# Patient Record
Sex: MALE | Race: OTHER | HISPANIC OR LATINO | URBAN - METROPOLITAN AREA
[De-identification: names, ages, dates, MRNs, and addresses within clinical notes are randomized per-mention and may not be internally consistent; named-entity substitution may affect disease eponyms.]

---

## 2017-11-10 ENCOUNTER — EMERGENCY (EMERGENCY)
Facility: HOSPITAL | Age: 22
LOS: 0 days | Discharge: HOME | End: 2017-11-10
Attending: EMERGENCY MEDICINE | Admitting: EMERGENCY MEDICINE
Payer: MEDICAID

## 2017-11-10 VITALS — HEIGHT: 73 IN | WEIGHT: 130.07 LBS

## 2017-11-10 VITALS — DIASTOLIC BLOOD PRESSURE: 83 MMHG | HEART RATE: 65 BPM | SYSTOLIC BLOOD PRESSURE: 121 MMHG | TEMPERATURE: 98 F

## 2017-11-10 DIAGNOSIS — R05 COUGH: ICD-10-CM

## 2017-11-10 DIAGNOSIS — B34.9 VIRAL INFECTION, UNSPECIFIED: ICD-10-CM

## 2017-11-10 DIAGNOSIS — F17.200 NICOTINE DEPENDENCE, UNSPECIFIED, UNCOMPLICATED: ICD-10-CM

## 2017-11-10 DIAGNOSIS — R06.02 SHORTNESS OF BREATH: ICD-10-CM

## 2017-11-10 PROCEDURE — 99284 EMERGENCY DEPT VISIT MOD MDM: CPT

## 2017-11-10 PROCEDURE — 71020: CPT | Mod: 26

## 2017-11-10 PROCEDURE — 93010 ELECTROCARDIOGRAM REPORT: CPT

## 2017-11-10 RX ORDER — KETOROLAC TROMETHAMINE 30 MG/ML
15 SYRINGE (ML) INJECTION ONCE
Qty: 0 | Refills: 0 | Status: DISCONTINUED | OUTPATIENT
Start: 2017-11-10 | End: 2017-11-10

## 2017-11-10 RX ORDER — SODIUM CHLORIDE 9 MG/ML
1000 INJECTION INTRAMUSCULAR; INTRAVENOUS; SUBCUTANEOUS ONCE
Qty: 0 | Refills: 0 | Status: COMPLETED | OUTPATIENT
Start: 2017-11-10 | End: 2017-11-10

## 2017-11-10 RX ORDER — SODIUM CHLORIDE 9 MG/ML
3 INJECTION INTRAMUSCULAR; INTRAVENOUS; SUBCUTANEOUS EVERY 8 HOURS
Qty: 0 | Refills: 0 | Status: DISCONTINUED | OUTPATIENT
Start: 2017-11-10 | End: 2017-11-10

## 2017-11-10 RX ADMIN — Medication 15 MILLIGRAM(S): at 10:40

## 2017-11-10 RX ADMIN — SODIUM CHLORIDE 3 MILLILITER(S): 9 INJECTION INTRAMUSCULAR; INTRAVENOUS; SUBCUTANEOUS at 10:36

## 2017-11-10 RX ADMIN — Medication 15 MILLIGRAM(S): at 10:58

## 2017-11-10 RX ADMIN — SODIUM CHLORIDE 1000 MILLILITER(S): 9 INJECTION INTRAMUSCULAR; INTRAVENOUS; SUBCUTANEOUS at 10:35

## 2017-11-10 NOTE — ED STATDOCS - PROGRESS NOTE DETAILS
22 yr. old male PMH: Denied presents to ED with chest pain + nasal congestion and SOB onset this am upon awakening. no fever or chills. Seen and examined by attending in Intake. Plan: Chest X-Ray, EKG and pain med. Will F/U with results and re evaluate. Remy VERDIN 22 yr. old male PMH: Denied presents to ED with chest pain last night + nasal congestion and SOB onset this am upon awakening + productive cough last night.  No fever or chills. + smoker 1/2 ppd.  Seen and examined by attending in Intake. Plan: Chest X-Ray, EKG and pain med. Will F/U with results and re evaluate. Remy VERDIN

## 2017-11-10 NOTE — ED STATDOCS - OBJECTIVE STATEMENT
23 y/o M c/o CP last night. Pt woke up this morning with nasal congestion and SOB. Pt describes CP as a tightness. Laying down does not exacerbate CP. +productive cough all last night with clear phelgm. No FMHx. Current smoker, half a pack a day. Drinks socially.

## 2017-11-10 NOTE — ED ADULT NURSE NOTE - OBJECTIVE STATEMENT
pt c/o chest pain and sob, pt admit of drinking alcohol every night 3-4 drinks  and smoking marihuana denies any medical problems except of anxiety

## 2017-11-10 NOTE — ED STATDOCS - ATTENDING CONTRIBUTION TO CARE
I performed the initial face to face bedside interview with this patient regarding history of present illness, review of symptoms and past medical, social and family history.  I completed an independent physical examination.  I was the initial provider who evaluated this patient.  I have signed out the follow up of any pending tests (i.e. labs, radiological studies) to the NP.  I have discussed the patient’s plan of care and disposition with the NP.

## 2017-11-10 NOTE — SBIRT NOTE. - NSSBIRTSERVICES_GEN_A_ED_FT
Provided SBIRT services: Full screen positive. Brief Intervention Performed. Screening results were reviewed with the patient and patient was provided information about healthy guidelines and potential negative consequences associated with level of risk. Motivation and readiness to reduce or stop use was discussed and goals and activities to make changes were suggested/offered.  Audit Score: 10  DAST Score: 1

## 2017-11-10 NOTE — ED STATDOCS - MEDICAL DECISION MAKING DETAILS
21 y/o M otherwise healthy, mostly likely with viral syndrome. Will get CXR and EKG with signs of early repolarization. Pt with no signs of pericarditis. Pt's sx not consistent with ACS. Based on CXR results will give abx.

## 2018-01-01 ENCOUNTER — OUTPATIENT (OUTPATIENT)
Dept: OUTPATIENT SERVICES | Facility: HOSPITAL | Age: 23
LOS: 1 days | End: 2018-01-01
Payer: MEDICAID

## 2018-01-01 PROCEDURE — G9001: CPT

## 2018-01-19 ENCOUNTER — EMERGENCY (EMERGENCY)
Facility: HOSPITAL | Age: 23
LOS: 0 days | Discharge: HOME | End: 2018-01-19
Attending: EMERGENCY MEDICINE | Admitting: EMERGENCY MEDICINE
Payer: MEDICAID

## 2018-01-19 VITALS
SYSTOLIC BLOOD PRESSURE: 124 MMHG | WEIGHT: 130.07 LBS | TEMPERATURE: 98 F | HEIGHT: 73 IN | HEART RATE: 108 BPM | OXYGEN SATURATION: 98 % | RESPIRATION RATE: 18 BRPM | DIASTOLIC BLOOD PRESSURE: 87 MMHG

## 2018-01-19 VITALS
SYSTOLIC BLOOD PRESSURE: 126 MMHG | HEART RATE: 88 BPM | TEMPERATURE: 97 F | RESPIRATION RATE: 18 BRPM | OXYGEN SATURATION: 100 % | DIASTOLIC BLOOD PRESSURE: 84 MMHG
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
## 2018-01-19 DIAGNOSIS — M25.571 PAIN IN RIGHT ANKLE AND JOINTS OF RIGHT FOOT: ICD-10-CM

## 2018-01-19 DIAGNOSIS — S90.01XA CONTUSION OF RIGHT ANKLE, INITIAL ENCOUNTER: ICD-10-CM

## 2018-01-19 DIAGNOSIS — S93.401A SPRAIN OF UNSPECIFIED LIGAMENT OF RIGHT ANKLE, INITIAL ENCOUNTER: ICD-10-CM

## 2018-01-19 PROCEDURE — 71045 X-RAY EXAM CHEST 1 VIEW: CPT | Mod: 26

## 2018-01-19 PROCEDURE — 99284 EMERGENCY DEPT VISIT MOD MDM: CPT | Mod: 25

## 2018-01-19 PROCEDURE — 73030 X-RAY EXAM OF SHOULDER: CPT | Mod: 26,LT

## 2018-01-19 PROCEDURE — 29505 APPLICATION LONG LEG SPLINT: CPT | Mod: RT

## 2018-01-19 PROCEDURE — 72190 X-RAY EXAM OF PELVIS: CPT | Mod: 26

## 2018-01-19 PROCEDURE — 73630 X-RAY EXAM OF FOOT: CPT | Mod: 26,LT

## 2018-01-19 PROCEDURE — 73610 X-RAY EXAM OF ANKLE: CPT | Mod: 26,RT

## 2018-01-19 RX ORDER — ACETAMINOPHEN 500 MG
650 TABLET ORAL ONCE
Qty: 0 | Refills: 0 | Status: COMPLETED | OUTPATIENT
Start: 2018-01-19 | End: 2018-01-19

## 2018-01-19 RX ADMIN — Medication 650 MILLIGRAM(S): at 02:05
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
## 2018-01-19 NOTE — ED PROVIDER NOTE - MUSCULOSKELETAL, MLM
+ ttp right lat mall and right mid fot 2+ dp and pt pulses nrom rle all digits achilles intact + ttp left ant shoulder nrom left shoulder 2+ distal pulses no sensory deficits.

## 2018-01-19 NOTE — ED PROVIDER NOTE - OBJECTIVE STATEMENT
pt presents with left shoulder pain and right ankle pain s.p jumping out first floor window pain constant achy worse with moveemnt . denies fever. denies HA or neck pain. no chest pain or sob. no abd pain. no n/v/d. no urinary f/u/d. no back pain. no motor or sensory deficits. denies illicit drug use. no rash. no other acute issues symptoms or concerns

## 2018-01-22 DIAGNOSIS — R69 ILLNESS, UNSPECIFIED: ICD-10-CM

## 2018-01-30 ENCOUNTER — EMERGENCY (EMERGENCY)
Facility: HOSPITAL | Age: 23
LOS: 0 days | Discharge: HOME | End: 2018-01-30
Attending: EMERGENCY MEDICINE | Admitting: EMERGENCY MEDICINE
Payer: MEDICAID

## 2018-01-30 VITALS
OXYGEN SATURATION: 100 % | DIASTOLIC BLOOD PRESSURE: 68 MMHG | HEART RATE: 83 BPM | TEMPERATURE: 98 F | SYSTOLIC BLOOD PRESSURE: 128 MMHG

## 2018-01-30 VITALS — HEIGHT: 73 IN | WEIGHT: 130.07 LBS

## 2018-01-30 DIAGNOSIS — S60.521A BLISTER (NONTHERMAL) OF RIGHT HAND, INITIAL ENCOUNTER: ICD-10-CM
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
## 2018-01-30 DIAGNOSIS — R22.31 LOCALIZED SWELLING, MASS AND LUMP, RIGHT UPPER LIMB: ICD-10-CM
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
## 2018-01-30 PROCEDURE — 10060 I&D ABSCESS SIMPLE/SINGLE: CPT

## 2018-01-30 PROCEDURE — 99283 EMERGENCY DEPT VISIT LOW MDM: CPT

## 2018-01-30 RX ORDER — CEPHALEXIN 500 MG
1 CAPSULE ORAL
Qty: 21 | Refills: 0
Start: 2018-01-30 | End: 2018-02-05

## 2018-01-30 NOTE — ED STATDOCS - OBJECTIVE STATEMENT
23 y/o M with no PMHx presents to the ED c/o bump to palmar surface of right hand. Pt has been using crutches and states he has developed the bump over his R hand over the past week after he cut his hand.

## 2018-01-30 NOTE — ED STATDOCS - PROGRESS NOTE DETAILS
incision and drainage performed to blister on R palm.  Patient tolerated well.  care reviewed.  patient to be d/c home on hernandez Tsang PA-C

## 2018-01-30 NOTE — ED ADULT NURSE NOTE - CHPI ED SYMPTOMS NEG
no chills/no decreased eating/drinking/no weakness/no nausea/no dizziness/no vomiting/no pain/no fever/no numbness/no tingling

## 2018-01-30 NOTE — ED ADULT TRIAGE NOTE - CHIEF COMPLAINT QUOTE
pt states painful cyst to right hand after using crutches from leg injury. no fever discharge or pus noted.

## 2019-04-18 ENCOUNTER — EMERGENCY (EMERGENCY)
Facility: HOSPITAL | Age: 24
LOS: 1 days | End: 2019-04-18
Attending: EMERGENCY MEDICINE
Payer: COMMERCIAL

## 2019-04-18 VITALS
HEIGHT: 70 IN | TEMPERATURE: 98 F | RESPIRATION RATE: 16 BRPM | OXYGEN SATURATION: 97 % | WEIGHT: 160.06 LBS | HEART RATE: 69 BPM | SYSTOLIC BLOOD PRESSURE: 129 MMHG | DIASTOLIC BLOOD PRESSURE: 80 MMHG

## 2019-04-18 VITALS
SYSTOLIC BLOOD PRESSURE: 125 MMHG | RESPIRATION RATE: 16 BRPM | OXYGEN SATURATION: 99 % | HEART RATE: 70 BPM | TEMPERATURE: 98 F | DIASTOLIC BLOOD PRESSURE: 82 MMHG

## 2019-04-18 LAB
APPEARANCE UR: CLEAR — SIGNIFICANT CHANGE UP
BACTERIA # UR AUTO: NEGATIVE — SIGNIFICANT CHANGE UP
BILIRUB UR-MCNC: NEGATIVE — SIGNIFICANT CHANGE UP
C TRACH RRNA SPEC QL NAA+PROBE: SIGNIFICANT CHANGE UP
COLOR SPEC: YELLOW — SIGNIFICANT CHANGE UP
DIFF PNL FLD: ABNORMAL
EPI CELLS # UR: NEGATIVE — SIGNIFICANT CHANGE UP
GLUCOSE UR QL: NEGATIVE MG/DL — SIGNIFICANT CHANGE UP
HIV 1 & 2 AB SERPL IA.RAPID: SIGNIFICANT CHANGE UP
KETONES UR-MCNC: NEGATIVE — SIGNIFICANT CHANGE UP
LEUKOCYTE ESTERASE UR-ACNC: NEGATIVE — SIGNIFICANT CHANGE UP
N GONORRHOEA RRNA SPEC QL NAA+PROBE: SIGNIFICANT CHANGE UP
NITRITE UR-MCNC: NEGATIVE — SIGNIFICANT CHANGE UP
PH UR: 7 — SIGNIFICANT CHANGE UP (ref 5–8)
PROT UR-MCNC: NEGATIVE MG/DL — SIGNIFICANT CHANGE UP
RBC CASTS # UR COMP ASSIST: ABNORMAL /HPF (ref 0–4)
SP GR SPEC: 1.01 — SIGNIFICANT CHANGE UP (ref 1.01–1.02)
SPECIMEN SOURCE: SIGNIFICANT CHANGE UP
UROBILINOGEN FLD QL: 1 MG/DL
WBC UR QL: SIGNIFICANT CHANGE UP

## 2019-04-18 PROCEDURE — 36415 COLL VENOUS BLD VENIPUNCTURE: CPT

## 2019-04-18 PROCEDURE — 99283 EMERGENCY DEPT VISIT LOW MDM: CPT | Mod: 25

## 2019-04-18 PROCEDURE — 87491 CHLMYD TRACH DNA AMP PROBE: CPT

## 2019-04-18 PROCEDURE — 86703 HIV-1/HIV-2 1 RESULT ANTBDY: CPT

## 2019-04-18 PROCEDURE — 87086 URINE CULTURE/COLONY COUNT: CPT

## 2019-04-18 PROCEDURE — 96372 THER/PROPH/DIAG INJ SC/IM: CPT

## 2019-04-18 PROCEDURE — 87591 N.GONORRHOEAE DNA AMP PROB: CPT

## 2019-04-18 PROCEDURE — 81001 URINALYSIS AUTO W/SCOPE: CPT

## 2019-04-18 RX ORDER — CEFTRIAXONE 500 MG/1
250 INJECTION, POWDER, FOR SOLUTION INTRAMUSCULAR; INTRAVENOUS ONCE
Qty: 0 | Refills: 0 | Status: COMPLETED | OUTPATIENT
Start: 2019-04-18 | End: 2019-04-18

## 2019-04-18 RX ADMIN — CEFTRIAXONE 250 MILLIGRAM(S): 500 INJECTION, POWDER, FOR SOLUTION INTRAMUSCULAR; INTRAVENOUS at 09:59

## 2019-04-18 RX ADMIN — Medication 100 MILLIGRAM(S): at 09:37

## 2019-04-18 NOTE — ED ADULT NURSE NOTE - OBJECTIVE STATEMENT
Pt A&OX3, amb ad simba, c/o burning and pain to penis upon urination, denies any fevers.  Abd soft nondistended, nontender, moving all ext well.

## 2019-04-18 NOTE — ED PROVIDER NOTE - PLAN OF CARE
1. Return to ED for worsening, progressive or any other concerning symptoms   2. Follow up with your primary care doctor in 2-3days   3. Take 100mg of Doxycycline twice a day for 7 days. Avoid sun exposure, use birth control while taking this medication
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
## 2019-04-19 LAB
CULTURE RESULTS: NO GROWTH — SIGNIFICANT CHANGE UP
SPECIMEN SOURCE: SIGNIFICANT CHANGE UP

## 2019-05-06 ENCOUNTER — EMERGENCY (EMERGENCY)
Facility: HOSPITAL | Age: 24
LOS: 1 days | Discharge: HOME | End: 2019-05-06
Attending: EMERGENCY MEDICINE
Payer: COMMERCIAL

## 2019-05-06 VITALS
RESPIRATION RATE: 16 BRPM | DIASTOLIC BLOOD PRESSURE: 65 MMHG | HEART RATE: 94 BPM | TEMPERATURE: 99 F | OXYGEN SATURATION: 98 % | SYSTOLIC BLOOD PRESSURE: 110 MMHG

## 2019-05-06 VITALS
SYSTOLIC BLOOD PRESSURE: 138 MMHG | HEIGHT: 69 IN | TEMPERATURE: 101 F | OXYGEN SATURATION: 99 % | WEIGHT: 169.98 LBS | HEART RATE: 99 BPM | DIASTOLIC BLOOD PRESSURE: 87 MMHG

## 2019-05-06 PROBLEM — Z78.9 OTHER SPECIFIED HEALTH STATUS: Chronic | Status: ACTIVE | Noted: 2019-04-18

## 2019-05-06 LAB
ALBUMIN SERPL ELPH-MCNC: 4.7 G/DL — SIGNIFICANT CHANGE UP (ref 3.3–5.2)
ALP SERPL-CCNC: 72 U/L — SIGNIFICANT CHANGE UP (ref 40–120)
ALT FLD-CCNC: 53 U/L — HIGH
AMPHET UR-MCNC: NEGATIVE — SIGNIFICANT CHANGE UP
ANION GAP SERPL CALC-SCNC: 17 MMOL/L — SIGNIFICANT CHANGE UP (ref 5–17)
APPEARANCE UR: CLEAR — SIGNIFICANT CHANGE UP
APTT BLD: 34.2 SEC — SIGNIFICANT CHANGE UP (ref 27.5–36.3)
AST SERPL-CCNC: 54 U/L — HIGH
BACTERIA # UR AUTO: NEGATIVE — SIGNIFICANT CHANGE UP
BARBITURATES UR SCN-MCNC: NEGATIVE — SIGNIFICANT CHANGE UP
BASOPHILS # BLD AUTO: 0 K/UL — SIGNIFICANT CHANGE UP (ref 0–0.2)
BASOPHILS NFR BLD AUTO: 0.4 % — SIGNIFICANT CHANGE UP (ref 0–2)
BENZODIAZ UR-MCNC: NEGATIVE — SIGNIFICANT CHANGE UP
BILIRUB SERPL-MCNC: 0.3 MG/DL — LOW (ref 0.4–2)
BILIRUB UR-MCNC: NEGATIVE — SIGNIFICANT CHANGE UP
BUN SERPL-MCNC: 8 MG/DL — SIGNIFICANT CHANGE UP (ref 8–20)
CALCIUM SERPL-MCNC: 9.2 MG/DL — SIGNIFICANT CHANGE UP (ref 8.6–10.2)
CHLORIDE SERPL-SCNC: 99 MMOL/L — SIGNIFICANT CHANGE UP (ref 98–107)
CO2 SERPL-SCNC: 21 MMOL/L — LOW (ref 22–29)
COCAINE METAB.OTHER UR-MCNC: NEGATIVE — SIGNIFICANT CHANGE UP
COLOR SPEC: YELLOW — SIGNIFICANT CHANGE UP
CREAT SERPL-MCNC: 0.8 MG/DL — SIGNIFICANT CHANGE UP (ref 0.5–1.3)
DIFF PNL FLD: ABNORMAL
EOSINOPHIL # BLD AUTO: 0 K/UL — SIGNIFICANT CHANGE UP (ref 0–0.5)
EOSINOPHIL NFR BLD AUTO: 0.1 % — SIGNIFICANT CHANGE UP (ref 0–5)
EPI CELLS # UR: NEGATIVE — SIGNIFICANT CHANGE UP
ETHANOL SERPL-MCNC: <10 MG/DL — SIGNIFICANT CHANGE UP
FLU A RESULT: SIGNIFICANT CHANGE UP
FLU A RESULT: SIGNIFICANT CHANGE UP
FLUAV AG NPH QL: SIGNIFICANT CHANGE UP
FLUBV AG NPH QL: SIGNIFICANT CHANGE UP
GLUCOSE SERPL-MCNC: 94 MG/DL — SIGNIFICANT CHANGE UP (ref 70–115)
GLUCOSE UR QL: NEGATIVE MG/DL — SIGNIFICANT CHANGE UP
HCT VFR BLD CALC: 43.8 % — SIGNIFICANT CHANGE UP (ref 42–52)
HGB BLD-MCNC: 14.8 G/DL — SIGNIFICANT CHANGE UP (ref 14–18)
INR BLD: 1 RATIO — SIGNIFICANT CHANGE UP (ref 0.88–1.16)
KETONES UR-MCNC: ABNORMAL
LACTATE BLDV-MCNC: 1.2 MMOL/L — SIGNIFICANT CHANGE UP (ref 0.5–2)
LEUKOCYTE ESTERASE UR-ACNC: NEGATIVE — SIGNIFICANT CHANGE UP
LYMPHOCYTES # BLD AUTO: 0.7 K/UL — LOW (ref 1–4.8)
LYMPHOCYTES # BLD AUTO: 7.1 % — LOW (ref 20–55)
MCHC RBC-ENTMCNC: 30.6 PG — SIGNIFICANT CHANGE UP (ref 27–31)
MCHC RBC-ENTMCNC: 33.8 G/DL — SIGNIFICANT CHANGE UP (ref 32–36)
MCV RBC AUTO: 90.7 FL — SIGNIFICANT CHANGE UP (ref 80–94)
METHADONE UR-MCNC: NEGATIVE — SIGNIFICANT CHANGE UP
MONOCYTES # BLD AUTO: 0.5 K/UL — SIGNIFICANT CHANGE UP (ref 0–0.8)
MONOCYTES NFR BLD AUTO: 5 % — SIGNIFICANT CHANGE UP (ref 3–10)
NEUTROPHILS # BLD AUTO: 8.6 K/UL — HIGH (ref 1.8–8)
NEUTROPHILS NFR BLD AUTO: 87.2 % — HIGH (ref 37–73)
NITRITE UR-MCNC: NEGATIVE — SIGNIFICANT CHANGE UP
OPIATES UR-MCNC: NEGATIVE — SIGNIFICANT CHANGE UP
PCP SPEC-MCNC: SIGNIFICANT CHANGE UP
PCP UR-MCNC: NEGATIVE — SIGNIFICANT CHANGE UP
PH UR: 8 — SIGNIFICANT CHANGE UP (ref 5–8)
PLATELET # BLD AUTO: 314 K/UL — SIGNIFICANT CHANGE UP (ref 150–400)
POTASSIUM SERPL-MCNC: 4.3 MMOL/L — SIGNIFICANT CHANGE UP (ref 3.5–5.3)
POTASSIUM SERPL-SCNC: 4.3 MMOL/L — SIGNIFICANT CHANGE UP (ref 3.5–5.3)
PROT SERPL-MCNC: 7.8 G/DL — SIGNIFICANT CHANGE UP (ref 6.6–8.7)
PROT UR-MCNC: 15 MG/DL
PROTHROM AB SERPL-ACNC: 11.5 SEC — SIGNIFICANT CHANGE UP (ref 10–12.9)
RBC # BLD: 4.83 M/UL — SIGNIFICANT CHANGE UP (ref 4.6–6.2)
RBC # FLD: 13.4 % — SIGNIFICANT CHANGE UP (ref 11–15.6)
RBC CASTS # UR COMP ASSIST: SIGNIFICANT CHANGE UP /HPF (ref 0–4)
RSV RESULT: SIGNIFICANT CHANGE UP
RSV RNA RESP QL NAA+PROBE: SIGNIFICANT CHANGE UP
SODIUM SERPL-SCNC: 137 MMOL/L — SIGNIFICANT CHANGE UP (ref 135–145)
SP GR SPEC: 1.01 — SIGNIFICANT CHANGE UP (ref 1.01–1.02)
THC UR QL: POSITIVE
UROBILINOGEN FLD QL: NEGATIVE MG/DL — SIGNIFICANT CHANGE UP
WBC # BLD: 9.9 K/UL — SIGNIFICANT CHANGE UP (ref 4.8–10.8)
WBC # FLD AUTO: 9.9 K/UL — SIGNIFICANT CHANGE UP (ref 4.8–10.8)
WBC UR QL: SIGNIFICANT CHANGE UP

## 2019-05-06 PROCEDURE — 93005 ELECTROCARDIOGRAM TRACING: CPT

## 2019-05-06 PROCEDURE — 74177 CT ABD & PELVIS W/CONTRAST: CPT

## 2019-05-06 PROCEDURE — 85610 PROTHROMBIN TIME: CPT

## 2019-05-06 PROCEDURE — 83605 ASSAY OF LACTIC ACID: CPT

## 2019-05-06 PROCEDURE — 87631 RESP VIRUS 3-5 TARGETS: CPT

## 2019-05-06 PROCEDURE — 80307 DRUG TEST PRSMV CHEM ANLYZR: CPT

## 2019-05-06 PROCEDURE — 74177 CT ABD & PELVIS W/CONTRAST: CPT | Mod: 26

## 2019-05-06 PROCEDURE — 99284 EMERGENCY DEPT VISIT MOD MDM: CPT | Mod: 25

## 2019-05-06 PROCEDURE — 99284 EMERGENCY DEPT VISIT MOD MDM: CPT

## 2019-05-06 PROCEDURE — 85730 THROMBOPLASTIN TIME PARTIAL: CPT

## 2019-05-06 PROCEDURE — 80053 COMPREHEN METABOLIC PANEL: CPT

## 2019-05-06 PROCEDURE — 81001 URINALYSIS AUTO W/SCOPE: CPT

## 2019-05-06 PROCEDURE — 85027 COMPLETE CBC AUTOMATED: CPT

## 2019-05-06 PROCEDURE — 93010 ELECTROCARDIOGRAM REPORT: CPT

## 2019-05-06 PROCEDURE — 87040 BLOOD CULTURE FOR BACTERIA: CPT

## 2019-05-06 PROCEDURE — 71045 X-RAY EXAM CHEST 1 VIEW: CPT

## 2019-05-06 PROCEDURE — 71045 X-RAY EXAM CHEST 1 VIEW: CPT | Mod: 26

## 2019-05-06 PROCEDURE — 87086 URINE CULTURE/COLONY COUNT: CPT

## 2019-05-06 PROCEDURE — 96374 THER/PROPH/DIAG INJ IV PUSH: CPT | Mod: XU

## 2019-05-06 PROCEDURE — 36415 COLL VENOUS BLD VENIPUNCTURE: CPT

## 2019-05-06 RX ORDER — ONDANSETRON 8 MG/1
4 TABLET, FILM COATED ORAL ONCE
Qty: 0 | Refills: 0 | Status: COMPLETED | OUTPATIENT
Start: 2019-05-06 | End: 2019-05-06

## 2019-05-06 RX ORDER — ACETAMINOPHEN 500 MG
975 TABLET ORAL ONCE
Qty: 0 | Refills: 0 | Status: COMPLETED | OUTPATIENT
Start: 2019-05-06 | End: 2019-05-06

## 2019-05-06 RX ORDER — SODIUM CHLORIDE 9 MG/ML
2500 INJECTION INTRAMUSCULAR; INTRAVENOUS; SUBCUTANEOUS ONCE
Qty: 0 | Refills: 0 | Status: COMPLETED | OUTPATIENT
Start: 2019-05-06 | End: 2019-05-06

## 2019-05-06 RX ADMIN — Medication 975 MILLIGRAM(S): at 14:51

## 2019-05-06 RX ADMIN — SODIUM CHLORIDE 2500 MILLILITER(S): 9 INJECTION INTRAMUSCULAR; INTRAVENOUS; SUBCUTANEOUS at 14:47

## 2019-05-06 RX ADMIN — ONDANSETRON 4 MILLIGRAM(S): 8 TABLET, FILM COATED ORAL at 14:47

## 2019-05-06 RX ADMIN — Medication 975 MILLIGRAM(S): at 14:48
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
## 2019-05-06 NOTE — ED PROVIDER NOTE - CLINICAL SUMMARY MEDICAL DECISION MAKING FREE TEXT BOX
Pt states that he feels much better. He tolerated PO with no pain. His abd is soft and non-surgical, he states his pain has resolved, no vomiting or diarrhea while here, no leukocytosis, stable for dc
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
## 2019-05-06 NOTE — ED ADULT NURSE NOTE - OBJECTIVE STATEMENT
pt reports body aches, rlq abdominal pain, nausea, diarrhea, sore throat starting this am. pt reports "I drank a lot last night. feel hungover." a and o x3. breathing even and unlabored. sitting calm in bed. will continue to monitor.

## 2019-05-07 LAB
CULTURE RESULTS: NO GROWTH — SIGNIFICANT CHANGE UP
SPECIMEN SOURCE: SIGNIFICANT CHANGE UP

## 2019-05-11 LAB
CULTURE RESULTS: SIGNIFICANT CHANGE UP
CULTURE RESULTS: SIGNIFICANT CHANGE UP
SPECIMEN SOURCE: SIGNIFICANT CHANGE UP
SPECIMEN SOURCE: SIGNIFICANT CHANGE UP

## 2019-06-11 ENCOUNTER — EMERGENCY (EMERGENCY)
Facility: HOSPITAL | Age: 24
LOS: 1 days | Discharge: HOME | End: 2019-06-11
Attending: STUDENT IN AN ORGANIZED HEALTH CARE EDUCATION/TRAINING PROGRAM
Payer: COMMERCIAL

## 2019-06-11 VITALS
WEIGHT: 175.05 LBS | HEART RATE: 94 BPM | OXYGEN SATURATION: 97 % | DIASTOLIC BLOOD PRESSURE: 74 MMHG | RESPIRATION RATE: 16 BRPM | SYSTOLIC BLOOD PRESSURE: 120 MMHG | TEMPERATURE: 98 F | HEIGHT: 73 IN

## 2019-06-11 PROCEDURE — 99284 EMERGENCY DEPT VISIT MOD MDM: CPT

## 2019-06-11 PROCEDURE — 99283 EMERGENCY DEPT VISIT LOW MDM: CPT

## 2019-06-11 RX ORDER — FLUTICASONE PROPIONATE 50 MCG
1 SPRAY, SUSPENSION NASAL
Qty: 1 | Refills: 0
Start: 2019-06-11 | End: 2019-06-25

## 2019-06-11 RX ORDER — IBUPROFEN 200 MG
1 TABLET ORAL
Qty: 20 | Refills: 0
Start: 2019-06-11

## 2019-06-11 RX ORDER — AZITHROMYCIN 500 MG/1
1 TABLET, FILM COATED ORAL
Qty: 1 | Refills: 0
Start: 2019-06-11 | End: 2019-06-15

## 2019-06-11 NOTE — ED ADULT TRIAGE NOTE - CHIEF COMPLAINT QUOTE
"I feel all congested and I have a sore throat." Pt denies fever, has Nyquil at home but did not take it.  A & OX4.

## 2019-06-11 NOTE — ED PROVIDER NOTE - OBJECTIVE STATEMENT
22 Y/o male no Sig PMH presents in Er and c.O cough with green productive phlegm , nasal congestion and sore throat x 2-3 days ,states he had subjective fever at home and one episodes of  diarrhea with green in color . did not take any med since then . pt is current smoker . denies any , leg swollen , cough blood or N/V abdominal pain

## 2019-06-11 NOTE — ED PROVIDER NOTE - CLINICAL SUMMARY MEDICAL DECISION MAKING FREE TEXT BOX
24 Y/o male With URI symptoms x 2-3 days and subjective fever at home   z pack , motrin /flonase - F.U pcp ( in [place] )

## 2019-06-11 NOTE — ED PROVIDER NOTE - ENMT, MLM
Airway patent, Nasal mucosa clear and moist . Mouth with normal mucosa. Throat has  mild erythema noted , small exudate on the left tensiles,  and uvula is midline. No adenopathy , B/l Ear TM clear

## 2019-06-19 NOTE — ED STATDOCS - ATTENDING CONTRIBUTION TO CARE
These should be separate appointments.  If she wants to \"push\" back her routine f/u visit, that will be fine.    Dr. Anaya     Attending Contribution to Care: I, Kim Raphael, performed the initial face to face bedside interview with this patient regarding history of present illness, review of symptoms and relevant past medical, social and family history.  I completed an independent physical examination.  I was the initial provider who evaluated this patient and the history, physical, and MDM reflect this intial assessment. I have signed out the follow up of any pending tests after the original (i.e. labs, radiological studies) to the ACP with instructions to review any with instructions to review any concerning findings to me prior to discharge.  I have communicated the patient’s plan of care and disposition with the ACP.

## 2019-08-27 ENCOUNTER — EMERGENCY (EMERGENCY)
Facility: HOSPITAL | Age: 24
LOS: 1 days | Discharge: HOME | End: 2019-08-27
Attending: EMERGENCY MEDICINE
Payer: COMMERCIAL

## 2019-08-27 VITALS
DIASTOLIC BLOOD PRESSURE: 62 MMHG | OXYGEN SATURATION: 99 % | TEMPERATURE: 98 F | HEIGHT: 73 IN | HEART RATE: 77 BPM | WEIGHT: 158.07 LBS | SYSTOLIC BLOOD PRESSURE: 107 MMHG | RESPIRATION RATE: 18 BRPM

## 2019-08-27 PROCEDURE — 99283 EMERGENCY DEPT VISIT LOW MDM: CPT

## 2019-08-27 RX ORDER — FLUNISOLIDE 25 MCG
2 AEROSOL, SPRAY (ML) NASAL
Qty: 1 | Refills: 0
Start: 2019-08-27

## 2019-08-27 RX ORDER — ACETAMINOPHEN 500 MG
2 TABLET ORAL
Qty: 30 | Refills: 0
Start: 2019-08-27

## 2019-08-27 NOTE — ED STATDOCS - PLAN OF CARE
Tylenol extra strength 2 tablets every 4 hours or Ibuprofen (motrin or advil) 3 tablets (total 600mg) every 6 hours for aches, pains, fevers or chills.  Dimetapp DM 20ml bedfore bedtime for cough/ congestion relief. Keep well hydrated: drink lots of fluids including orange juice, tea with honey/ lemon dave, and chicken broth. Rest.  Return immediately to the ER for re-examination if your symptoms are worsening. Otherwise, follow-up with your doctor in 2-3 days for re-evaluation.

## 2019-08-27 NOTE — ED STATDOCS - PATIENT PORTAL LINK FT
You can access the FollowMyHealth Patient Portal offered by [facility] by registering at the following website: [email or web address]. By joining [facility]’s FollowMyHealth portal, you will also be able to view your health information using other applications (apps) compatible with our system.

## 2019-08-27 NOTE — ED STATDOCS - CARE PLAN
Principal Discharge DX:	URI, acute  Assessment and plan of treatment:	Tylenol extra strength 2 tablets every 4 hours or Ibuprofen (motrin or advil) 3 tablets (total 600mg) every 6 hours for aches, pains, fevers or chills.  Dimetapp DM 20ml bedfore bedtime for cough/ congestion relief. Keep well hydrated: drink lots of fluids including orange juice, tea with honey/ lemon dave, and chicken broth. Rest.  Return immediately to the ER for re-examination if your symptoms are worsening. Otherwise, follow-up with your doctor in 2-3 days for re-evaluation.

## 2021-07-24 ENCOUNTER — INPATIENT (INPATIENT)
Facility: HOSPITAL | Age: 26
LOS: 2 days | Discharge: HOME | DRG: 87 | End: 2021-07-27
Attending: SURGERY | Admitting: SURGERY
Payer: COMMERCIAL

## 2021-07-24 VITALS
HEART RATE: 81 BPM | DIASTOLIC BLOOD PRESSURE: 88 MMHG | TEMPERATURE: 98 F | HEIGHT: 73 IN | OXYGEN SATURATION: 97 % | SYSTOLIC BLOOD PRESSURE: 127 MMHG | WEIGHT: 169.98 LBS | RESPIRATION RATE: 17 BRPM

## 2021-07-24 DIAGNOSIS — S06.340A TRAUMATIC HEMORRHAGE OF RIGHT CEREBRUM WITHOUT LOSS OF CONSCIOUSNESS, INITIAL ENCOUNTER: ICD-10-CM

## 2021-07-24 LAB
ALBUMIN SERPL ELPH-MCNC: 4.8 G/DL — SIGNIFICANT CHANGE UP (ref 3.3–5.2)
ALP SERPL-CCNC: 85 U/L — SIGNIFICANT CHANGE UP (ref 40–120)
ALT FLD-CCNC: 33 U/L — SIGNIFICANT CHANGE UP
ANION GAP SERPL CALC-SCNC: 13 MMOL/L — SIGNIFICANT CHANGE UP (ref 5–17)
APTT BLD: 40.9 SEC — HIGH (ref 27.5–35.5)
AST SERPL-CCNC: 30 U/L — SIGNIFICANT CHANGE UP
BASOPHILS # BLD AUTO: 0.08 K/UL — SIGNIFICANT CHANGE UP (ref 0–0.2)
BASOPHILS NFR BLD AUTO: 0.6 % — SIGNIFICANT CHANGE UP (ref 0–2)
BILIRUB SERPL-MCNC: 0.5 MG/DL — SIGNIFICANT CHANGE UP (ref 0.4–2)
BLD GP AB SCN SERPL QL: SIGNIFICANT CHANGE UP
BUN SERPL-MCNC: 7.1 MG/DL — LOW (ref 8–20)
CALCIUM SERPL-MCNC: 9.8 MG/DL — SIGNIFICANT CHANGE UP (ref 8.6–10.2)
CHLORIDE SERPL-SCNC: 98 MMOL/L — SIGNIFICANT CHANGE UP (ref 98–107)
CO2 SERPL-SCNC: 25 MMOL/L — SIGNIFICANT CHANGE UP (ref 22–29)
CREAT SERPL-MCNC: 0.76 MG/DL — SIGNIFICANT CHANGE UP (ref 0.5–1.3)
EOSINOPHIL # BLD AUTO: 0.18 K/UL — SIGNIFICANT CHANGE UP (ref 0–0.5)
EOSINOPHIL NFR BLD AUTO: 1.3 % — SIGNIFICANT CHANGE UP (ref 0–6)
GLUCOSE SERPL-MCNC: 108 MG/DL — HIGH (ref 70–99)
HCT VFR BLD CALC: 45.9 % — SIGNIFICANT CHANGE UP (ref 39–50)
HGB BLD-MCNC: 15.1 G/DL — SIGNIFICANT CHANGE UP (ref 13–17)
IMM GRANULOCYTES NFR BLD AUTO: 0.4 % — SIGNIFICANT CHANGE UP (ref 0–1.5)
INR BLD: 1.05 RATIO — SIGNIFICANT CHANGE UP (ref 0.88–1.16)
LYMPHOCYTES # BLD AUTO: 15.4 % — SIGNIFICANT CHANGE UP (ref 13–44)
LYMPHOCYTES # BLD AUTO: 2.11 K/UL — SIGNIFICANT CHANGE UP (ref 1–3.3)
MCHC RBC-ENTMCNC: 30.4 PG — SIGNIFICANT CHANGE UP (ref 27–34)
MCHC RBC-ENTMCNC: 32.9 GM/DL — SIGNIFICANT CHANGE UP (ref 32–36)
MCV RBC AUTO: 92.4 FL — SIGNIFICANT CHANGE UP (ref 80–100)
MONOCYTES # BLD AUTO: 1.04 K/UL — HIGH (ref 0–0.9)
MONOCYTES NFR BLD AUTO: 7.6 % — SIGNIFICANT CHANGE UP (ref 2–14)
NEUTROPHILS # BLD AUTO: 10.25 K/UL — HIGH (ref 1.8–7.4)
NEUTROPHILS NFR BLD AUTO: 74.7 % — SIGNIFICANT CHANGE UP (ref 43–77)
PLATELET # BLD AUTO: 328 K/UL — SIGNIFICANT CHANGE UP (ref 150–400)
POTASSIUM SERPL-MCNC: 4.1 MMOL/L — SIGNIFICANT CHANGE UP (ref 3.5–5.3)
POTASSIUM SERPL-SCNC: 4.1 MMOL/L — SIGNIFICANT CHANGE UP (ref 3.5–5.3)
PROT SERPL-MCNC: 8.1 G/DL — SIGNIFICANT CHANGE UP (ref 6.6–8.7)
PROTHROM AB SERPL-ACNC: 12.1 SEC — SIGNIFICANT CHANGE UP (ref 10.6–13.6)
RBC # BLD: 4.97 M/UL — SIGNIFICANT CHANGE UP (ref 4.2–5.8)
RBC # FLD: 12.9 % — SIGNIFICANT CHANGE UP (ref 10.3–14.5)
SARS-COV-2 RNA SPEC QL NAA+PROBE: SIGNIFICANT CHANGE UP
SODIUM SERPL-SCNC: 136 MMOL/L — SIGNIFICANT CHANGE UP (ref 135–145)
WBC # BLD: 13.72 K/UL — HIGH (ref 3.8–10.5)
WBC # FLD AUTO: 13.72 K/UL — HIGH (ref 3.8–10.5)

## 2021-07-24 PROCEDURE — 73130 X-RAY EXAM OF HAND: CPT | Mod: 26,LT

## 2021-07-24 PROCEDURE — 70450 CT HEAD/BRAIN W/O DYE: CPT | Mod: 26,77

## 2021-07-24 PROCEDURE — 70450 CT HEAD/BRAIN W/O DYE: CPT | Mod: 26

## 2021-07-24 PROCEDURE — 70486 CT MAXILLOFACIAL W/O DYE: CPT | Mod: 26,MG

## 2021-07-24 PROCEDURE — 99222 1ST HOSP IP/OBS MODERATE 55: CPT

## 2021-07-24 PROCEDURE — 72125 CT NECK SPINE W/O DYE: CPT | Mod: 26

## 2021-07-24 PROCEDURE — 99282 EMERGENCY DEPT VISIT SF MDM: CPT

## 2021-07-24 PROCEDURE — 99291 CRITICAL CARE FIRST HOUR: CPT

## 2021-07-24 PROCEDURE — G1004: CPT

## 2021-07-24 RX ORDER — ACETAMINOPHEN 500 MG
650 TABLET ORAL EVERY 6 HOURS
Refills: 0 | Status: DISCONTINUED | OUTPATIENT
Start: 2021-07-24 | End: 2021-07-27

## 2021-07-24 RX ORDER — ACETAMINOPHEN 500 MG
650 TABLET ORAL ONCE
Refills: 0 | Status: COMPLETED | OUTPATIENT
Start: 2021-07-24 | End: 2021-07-24

## 2021-07-24 RX ORDER — ONDANSETRON 8 MG/1
4 TABLET, FILM COATED ORAL EVERY 12 HOURS
Refills: 0 | Status: DISCONTINUED | OUTPATIENT
Start: 2021-07-24 | End: 2021-07-25

## 2021-07-24 RX ORDER — SODIUM CHLORIDE 9 MG/ML
1000 INJECTION, SOLUTION INTRAVENOUS
Refills: 0 | Status: DISCONTINUED | OUTPATIENT
Start: 2021-07-24 | End: 2021-07-25

## 2021-07-24 RX ORDER — ACETAMINOPHEN 500 MG
1000 TABLET ORAL ONCE
Refills: 0 | Status: COMPLETED | OUTPATIENT
Start: 2021-07-24 | End: 2021-07-24

## 2021-07-24 RX ADMIN — Medication 650 MILLIGRAM(S): at 15:41

## 2021-07-24 RX ADMIN — SODIUM CHLORIDE 125 MILLILITER(S): 9 INJECTION, SOLUTION INTRAVENOUS at 18:15

## 2021-07-24 RX ADMIN — Medication 650 MILLIGRAM(S): at 15:00

## 2021-07-24 RX ADMIN — Medication 1000 MILLIGRAM(S): at 16:35

## 2021-07-24 RX ADMIN — Medication 400 MILLIGRAM(S): at 23:56

## 2021-07-24 RX ADMIN — Medication 1000 MILLIGRAM(S): at 17:43

## 2021-07-24 RX ADMIN — Medication 400 MILLIGRAM(S): at 16:34
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
## 2021-07-24 NOTE — ED STATDOCS - NS ED ROS FT
ROS: No fever/chills. No eye pain/changes in vision, +ear pain. no sore throat/dysphagia, No chest pain/palpitations. No SOB/cough/. No abdominal pain, N/V/D, no black/bloody bm. No dysuria/frequency/discharge, No headache. No Dizziness.    No rashes or breaks in skin. No numbness/tingling/weakness. +left hand swelling +no taste
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
## 2021-07-24 NOTE — ED STATDOCS - CRITICAL CARE ATTENDING CONTRIBUTION TO CARE
Donovan: I performed a face to face bedside interview with patient regarding history of present illness, review of symptoms and past medical history. I completed an independent physical exam and ordered tests/medications as needed.  I have discussed patient's plan of care with advanced care provider. The advanced care provider assisted in  executing the discussed plan. I was available for any questions or issues that may have arose during the execution of the plan of care.     I independently spent above time on critical care.

## 2021-07-24 NOTE — H&P ADULT - ATTENDING COMMENTS
Patient seen and examined. Agree with note above. Pt A&O x 3, no deficits, GCS 15. Per NSG, ok for admission to SDU. Will observe overnight.
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
## 2021-07-24 NOTE — ED ADULT TRIAGE NOTE - NS ED NURSE BANDS TYPE
CERTIFICATE OF RETURN TO WORK    July 26, 2018      Re:   Clyde Alonso  [address] [facility] [id number]                        This is to certify that Clyde Alonso has been under my care from 7/26/2018 and can return to regular work on 7/27    RESTRICTIONS:        REMARKS: pt missed from 7/23 through 7/26 due to illness.        SIGNATURE:___________________________________________       Aram Orlando M.D.     [address] [place]  [address] [phone]         Name band;

## 2021-07-24 NOTE — ED STATDOCS - PHYSICAL EXAMINATION
Gen: No acute distress, non toxic  HEENT: Mucous membranes moist, pink conjunctivae, EOMI. Right TM with hemotympanum and dried blood in canal. No lemus sign/raccoon eyes.   CV: RRR, nl s1/s2.  Resp: CTAB, normal rate and effort  GI: Abdomen soft, NT, ND. No rebound, no guarding  : No CVAT  Neuro: A&O x 3, moving all 4 extremities  MSK: No spine or joint tenderness to palpation. Dorsum left hand with minor swelling, no erythema, warmth or fluctuance  Neurovascular intact.   Skin: No rashes. intact and perfused. Gen: No acute distress, non toxic  HEENT: Mucous membranes moist, pink conjunctivae, EOMI. Right TM with hemotympanum and dried blood in canal. No lemus sign/raccoon eyes.   CV: RRR, nl s1/s2.  Resp: CTAB, normal rate and effort  GI: Abdomen soft, NT, ND. No rebound, no guarding  : No CVAT  Neuro: A&O x 3, moving all 4 extremities  MSK: No spine or joint tenderness to palpation. Dorsum left hand with minor swelling, no erythema, warmth or fluctuance  Neurovascular intact. gcs 15, ambulating, nl motor/sensation and cn 2-12.   Skin: No rashes. intact and perfused.
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
## 2021-07-24 NOTE — ED ADULT NURSE REASSESSMENT NOTE - NS ED NURSE REASSESS COMMENT FT1
Assumed care of patient at 1515, patient moved to critical care room and placed on the monitor. Patient reports mild headache. Neuro and trauma at the bedside with the patient.

## 2021-07-24 NOTE — H&P ADULT - NSHPPHYSICALEXAM_GEN_ALL_CORE
Constitutional: Well-developed well nourished Male in no acute distress  HEENT: Head is normocephalic and no external evidence of trauma. Maxillofacial structures stable, no blood or discharge from nares or oral cavity, no lemus sign / racoon eyes, EOMI b/l, pupils 4mm round and reactive to light b/l, no active drainage or redness  Neck: cervical collar in place, trachea midline  Respiratory: Breath sounds CTA b/l respirations are unlabored, no accessory muscle use, no conversational dyspnea  Cardiovascular: Regular rate & rhythm, +S1, S1, Chest wall is non-tender to palpation, no subQ emphysema or crepitus palpated  Gastrointestinal: Abdomen soft, non-tender, non-distended, no rebound tenderness / guarding, no ecchymosis or external signs of abdominal trauma  Musculoskeletal: moving all extremities spontaneously, 5/5 motor strength of b/l Upper and lower extremities. no point tenderness or deformity noted to upper or lower extremities b/l  Pelvis: stable  Vascular: 2+ radial, femoral, and DP pulses b/l  Neurological: GCS: 15 (4/5/6). A&O x 3; no gross sensory / motor / coordination deficits  Neurospinal: no C/T/L/S spine tenderness to palpation, no step-offs or signs of external trauma to the back
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
## 2021-07-24 NOTE — ED STATDOCS - PROGRESS NOTE DETAILS
Donovan: pt with bleed, moved to crit care. trauma and neurosurg notified. Donovan: pt with 1cm bleed, moved to crit care. trauma and neurosurg notified.

## 2021-07-24 NOTE — ED ADULT NURSE NOTE - NSIMPLEMENTINTERV_GEN_ALL_ED
Implemented All Universal Safety Interventions:  [place] to call system. Call bell, personal items and telephone within reach. Instruct patient to call for assistance. Room bathroom lighting operational. Non-slip footwear when patient is off stretcher. Physically safe environment: no spills, clutter or unnecessary equipment. Stretcher in lowest position, wheels locked, appropriate side rails in place.

## 2021-07-24 NOTE — ED STATDOCS - ATTENDING CONTRIBUTION TO CARE
Donovan: I performed a face to face bedside interview with patient regarding history of present illness, review of symptoms and past medical history. I completed an independent physical exam and ordered tests/medications as needed.  I have discussed patient's plan of care with advanced care provider. The advanced care provider assisted in  executing the discussed plan. I was available for any questions or issues that may have arose during the execution of the plan of care.
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
## 2021-07-24 NOTE — ED STATDOCS - CARE PLAN
Principal Discharge DX:	Traumatic hemorrhage of right cerebrum without loss of consciousness, initial encounter
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
## 2021-07-25 LAB
ANION GAP SERPL CALC-SCNC: 15 MMOL/L — SIGNIFICANT CHANGE UP (ref 5–17)
BUN SERPL-MCNC: 5.7 MG/DL — LOW (ref 8–20)
CALCIUM SERPL-MCNC: 9.9 MG/DL — SIGNIFICANT CHANGE UP (ref 8.6–10.2)
CHLORIDE SERPL-SCNC: 100 MMOL/L — SIGNIFICANT CHANGE UP (ref 98–107)
CO2 SERPL-SCNC: 24 MMOL/L — SIGNIFICANT CHANGE UP (ref 22–29)
COVID-19 SPIKE DOMAIN AB INTERP: POSITIVE
COVID-19 SPIKE DOMAIN ANTIBODY RESULT: >250 U/ML — HIGH
CREAT SERPL-MCNC: 0.69 MG/DL — SIGNIFICANT CHANGE UP (ref 0.5–1.3)
GLUCOSE SERPL-MCNC: 110 MG/DL — HIGH (ref 70–99)
HCT VFR BLD CALC: 46.9 % — SIGNIFICANT CHANGE UP (ref 39–50)
HGB BLD-MCNC: 15.3 G/DL — SIGNIFICANT CHANGE UP (ref 13–17)
MAGNESIUM SERPL-MCNC: 2.2 MG/DL — SIGNIFICANT CHANGE UP (ref 1.6–2.6)
MCHC RBC-ENTMCNC: 30.2 PG — SIGNIFICANT CHANGE UP (ref 27–34)
MCHC RBC-ENTMCNC: 32.6 GM/DL — SIGNIFICANT CHANGE UP (ref 32–36)
MCV RBC AUTO: 92.7 FL — SIGNIFICANT CHANGE UP (ref 80–100)
PHOSPHATE SERPL-MCNC: 3 MG/DL — SIGNIFICANT CHANGE UP (ref 2.4–4.7)
PLATELET # BLD AUTO: 338 K/UL — SIGNIFICANT CHANGE UP (ref 150–400)
POTASSIUM SERPL-MCNC: 4 MMOL/L — SIGNIFICANT CHANGE UP (ref 3.5–5.3)
POTASSIUM SERPL-SCNC: 4 MMOL/L — SIGNIFICANT CHANGE UP (ref 3.5–5.3)
RBC # BLD: 5.06 M/UL — SIGNIFICANT CHANGE UP (ref 4.2–5.8)
RBC # FLD: 13.1 % — SIGNIFICANT CHANGE UP (ref 10.3–14.5)
SARS-COV-2 IGG+IGM SERPL QL IA: >250 U/ML — HIGH
SARS-COV-2 IGG+IGM SERPL QL IA: POSITIVE
SODIUM SERPL-SCNC: 139 MMOL/L — SIGNIFICANT CHANGE UP (ref 135–145)
WBC # BLD: 11.29 K/UL — HIGH (ref 3.8–10.5)
WBC # FLD AUTO: 11.29 K/UL — HIGH (ref 3.8–10.5)

## 2021-07-25 PROCEDURE — 99232 SBSQ HOSP IP/OBS MODERATE 35: CPT

## 2021-07-25 RX ORDER — TRAMADOL HYDROCHLORIDE 50 MG/1
25 TABLET ORAL EVERY 6 HOURS
Refills: 0 | Status: DISCONTINUED | OUTPATIENT
Start: 2021-07-25 | End: 2021-07-26

## 2021-07-25 RX ORDER — ONDANSETRON 8 MG/1
4 TABLET, FILM COATED ORAL EVERY 12 HOURS
Refills: 0 | Status: DISCONTINUED | OUTPATIENT
Start: 2021-07-25 | End: 2021-07-27

## 2021-07-25 RX ORDER — TRAMADOL HYDROCHLORIDE 50 MG/1
50 TABLET ORAL ONCE
Refills: 0 | Status: DISCONTINUED | OUTPATIENT
Start: 2021-07-25 | End: 2021-07-25

## 2021-07-25 RX ADMIN — ONDANSETRON 4 MILLIGRAM(S): 8 TABLET, FILM COATED ORAL at 17:27

## 2021-07-25 RX ADMIN — Medication 650 MILLIGRAM(S): at 05:24

## 2021-07-25 RX ADMIN — TRAMADOL HYDROCHLORIDE 50 MILLIGRAM(S): 50 TABLET ORAL at 09:19

## 2021-07-25 RX ADMIN — ONDANSETRON 4 MILLIGRAM(S): 8 TABLET, FILM COATED ORAL at 05:03

## 2021-07-25 RX ADMIN — SODIUM CHLORIDE 125 MILLILITER(S): 9 INJECTION, SOLUTION INTRAVENOUS at 05:24

## 2021-07-25 RX ADMIN — Medication 650 MILLIGRAM(S): at 15:40

## 2021-07-25 RX ADMIN — TRAMADOL HYDROCHLORIDE 25 MILLIGRAM(S): 50 TABLET ORAL at 20:15

## 2021-07-25 RX ADMIN — TRAMADOL HYDROCHLORIDE 25 MILLIGRAM(S): 50 TABLET ORAL at 19:51

## 2021-07-25 RX ADMIN — Medication 1000 MILLIGRAM(S): at 00:14

## 2021-07-25 RX ADMIN — Medication 650 MILLIGRAM(S): at 23:30
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
## 2021-07-26 PROCEDURE — 70450 CT HEAD/BRAIN W/O DYE: CPT | Mod: 26,59,76

## 2021-07-26 PROCEDURE — 99231 SBSQ HOSP IP/OBS SF/LOW 25: CPT

## 2021-07-26 PROCEDURE — 99232 SBSQ HOSP IP/OBS MODERATE 35: CPT

## 2021-07-26 PROCEDURE — 70496 CT ANGIOGRAPHY HEAD: CPT | Mod: 26

## 2021-07-26 RX ORDER — ACETAMINOPHEN 500 MG
1000 TABLET ORAL ONCE
Refills: 0 | Status: DISCONTINUED | OUTPATIENT
Start: 2021-07-26 | End: 2021-07-27

## 2021-07-26 RX ORDER — MORPHINE SULFATE 50 MG/1
1 CAPSULE, EXTENDED RELEASE ORAL EVERY 6 HOURS
Refills: 0 | Status: DISCONTINUED | OUTPATIENT
Start: 2021-07-26 | End: 2021-07-27

## 2021-07-26 RX ORDER — LANOLIN ALCOHOL/MO/W.PET/CERES
5 CREAM (GRAM) TOPICAL AT BEDTIME
Refills: 0 | Status: DISCONTINUED | OUTPATIENT
Start: 2021-07-26 | End: 2021-07-26

## 2021-07-26 RX ORDER — LANOLIN ALCOHOL/MO/W.PET/CERES
5 CREAM (GRAM) TOPICAL AT BEDTIME
Refills: 0 | Status: DISCONTINUED | OUTPATIENT
Start: 2021-07-26 | End: 2021-07-27

## 2021-07-26 RX ORDER — ONDANSETRON 8 MG/1
4 TABLET, FILM COATED ORAL ONCE
Refills: 0 | Status: COMPLETED | OUTPATIENT
Start: 2021-07-26 | End: 2021-07-26

## 2021-07-26 RX ADMIN — MORPHINE SULFATE 1 MILLIGRAM(S): 50 CAPSULE, EXTENDED RELEASE ORAL at 18:12

## 2021-07-26 RX ADMIN — Medication 650 MILLIGRAM(S): at 09:41

## 2021-07-26 RX ADMIN — MORPHINE SULFATE 1 MILLIGRAM(S): 50 CAPSULE, EXTENDED RELEASE ORAL at 18:28

## 2021-07-26 RX ADMIN — ONDANSETRON 4 MILLIGRAM(S): 8 TABLET, FILM COATED ORAL at 18:31

## 2021-07-26 RX ADMIN — Medication 650 MILLIGRAM(S): at 10:54

## 2021-07-26 RX ADMIN — ONDANSETRON 4 MILLIGRAM(S): 8 TABLET, FILM COATED ORAL at 09:45

## 2021-07-26 RX ADMIN — Medication 2 TABLET(S): at 21:40

## 2021-07-26 RX ADMIN — Medication 2 TABLET(S): at 21:10

## 2021-07-26 RX ADMIN — TRAMADOL HYDROCHLORIDE 25 MILLIGRAM(S): 50 TABLET ORAL at 17:24

## 2021-07-26 RX ADMIN — TRAMADOL HYDROCHLORIDE 25 MILLIGRAM(S): 50 TABLET ORAL at 05:37

## 2021-07-26 RX ADMIN — TRAMADOL HYDROCHLORIDE 25 MILLIGRAM(S): 50 TABLET ORAL at 18:07

## 2021-07-26 RX ADMIN — Medication 650 MILLIGRAM(S): at 00:00
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
## 2021-07-26 NOTE — CHART NOTE - NSCHARTNOTEFT_GEN_A_CORE
RN notified provider about worsening headache. States he has not had an appetite today. Patient received Tramadol 25 with no relief. Seen and examined, neuro exam stable no deficits. Patient now complaining of superior to occipital ridge, midline, headache. Yesterday, headache was more localized to R temporal. No fluid from ears noted. Ordered Fiorcet, Ofirmer, morphine IM, and zofran PRN with most relief.  STAT CTH ordered, will continue to monitor.
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
## 2021-07-26 NOTE — PHYSICAL THERAPY INITIAL EVALUATION ADULT - ADDITIONAL COMMENTS
Pt. plans to stay with his boyfriend upon d/c in an apartment without stairs. No stairs inside. Pt. was independent PTA and does not own DME.

## 2021-07-27 ENCOUNTER — TRANSCRIPTION ENCOUNTER (OUTPATIENT)
Age: 26
End: 2021-07-27

## 2021-07-27 VITALS
RESPIRATION RATE: 18 BRPM | OXYGEN SATURATION: 95 % | SYSTOLIC BLOOD PRESSURE: 104 MMHG | DIASTOLIC BLOOD PRESSURE: 65 MMHG | HEART RATE: 81 BPM | TEMPERATURE: 99 F

## 2021-07-27 LAB
ANION GAP SERPL CALC-SCNC: 15 MMOL/L — SIGNIFICANT CHANGE UP (ref 5–17)
BASOPHILS # BLD AUTO: 0.07 K/UL — SIGNIFICANT CHANGE UP (ref 0–0.2)
BASOPHILS NFR BLD AUTO: 0.7 % — SIGNIFICANT CHANGE UP (ref 0–2)
BUN SERPL-MCNC: 7.9 MG/DL — LOW (ref 8–20)
CALCIUM SERPL-MCNC: 9.8 MG/DL — SIGNIFICANT CHANGE UP (ref 8.6–10.2)
CHLORIDE SERPL-SCNC: 98 MMOL/L — SIGNIFICANT CHANGE UP (ref 98–107)
CO2 SERPL-SCNC: 24 MMOL/L — SIGNIFICANT CHANGE UP (ref 22–29)
CREAT SERPL-MCNC: 0.78 MG/DL — SIGNIFICANT CHANGE UP (ref 0.5–1.3)
EOSINOPHIL # BLD AUTO: 0.19 K/UL — SIGNIFICANT CHANGE UP (ref 0–0.5)
EOSINOPHIL NFR BLD AUTO: 1.8 % — SIGNIFICANT CHANGE UP (ref 0–6)
GLUCOSE SERPL-MCNC: 87 MG/DL — SIGNIFICANT CHANGE UP (ref 70–99)
HCT VFR BLD CALC: 45.8 % — SIGNIFICANT CHANGE UP (ref 39–50)
HGB BLD-MCNC: 15.2 G/DL — SIGNIFICANT CHANGE UP (ref 13–17)
IMM GRANULOCYTES NFR BLD AUTO: 0.3 % — SIGNIFICANT CHANGE UP (ref 0–1.5)
LYMPHOCYTES # BLD AUTO: 2.86 K/UL — SIGNIFICANT CHANGE UP (ref 1–3.3)
LYMPHOCYTES # BLD AUTO: 26.9 % — SIGNIFICANT CHANGE UP (ref 13–44)
MAGNESIUM SERPL-MCNC: 2 MG/DL — SIGNIFICANT CHANGE UP (ref 1.6–2.6)
MCHC RBC-ENTMCNC: 30.6 PG — SIGNIFICANT CHANGE UP (ref 27–34)
MCHC RBC-ENTMCNC: 33.2 GM/DL — SIGNIFICANT CHANGE UP (ref 32–36)
MCV RBC AUTO: 92.3 FL — SIGNIFICANT CHANGE UP (ref 80–100)
MONOCYTES # BLD AUTO: 0.85 K/UL — SIGNIFICANT CHANGE UP (ref 0–0.9)
MONOCYTES NFR BLD AUTO: 8 % — SIGNIFICANT CHANGE UP (ref 2–14)
NEUTROPHILS # BLD AUTO: 6.65 K/UL — SIGNIFICANT CHANGE UP (ref 1.8–7.4)
NEUTROPHILS NFR BLD AUTO: 62.3 % — SIGNIFICANT CHANGE UP (ref 43–77)
PLATELET # BLD AUTO: 348 K/UL — SIGNIFICANT CHANGE UP (ref 150–400)
POTASSIUM SERPL-MCNC: 4.1 MMOL/L — SIGNIFICANT CHANGE UP (ref 3.5–5.3)
POTASSIUM SERPL-SCNC: 4.1 MMOL/L — SIGNIFICANT CHANGE UP (ref 3.5–5.3)
RBC # BLD: 4.96 M/UL — SIGNIFICANT CHANGE UP (ref 4.2–5.8)
RBC # FLD: 12.6 % — SIGNIFICANT CHANGE UP (ref 10.3–14.5)
SODIUM SERPL-SCNC: 137 MMOL/L — SIGNIFICANT CHANGE UP (ref 135–145)
WBC # BLD: 10.65 K/UL — HIGH (ref 3.8–10.5)
WBC # FLD AUTO: 10.65 K/UL — HIGH (ref 3.8–10.5)

## 2021-07-27 PROCEDURE — 99231 SBSQ HOSP IP/OBS SF/LOW 25: CPT

## 2021-07-27 PROCEDURE — 99232 SBSQ HOSP IP/OBS MODERATE 35: CPT

## 2021-07-27 RX ORDER — FLUTICASONE PROPIONATE 50 MCG
1 SPRAY, SUSPENSION NASAL
Qty: 1 | Refills: 0
Start: 2021-07-27 | End: 2021-08-10

## 2021-07-27 RX ORDER — FLUTICASONE PROPIONATE 50 MCG
1 SPRAY, SUSPENSION NASAL
Qty: 1 | Refills: 1
Start: 2021-07-27 | End: 2021-09-25

## 2021-07-27 RX ADMIN — Medication 2 TABLET(S): at 04:17

## 2021-07-27 RX ADMIN — Medication 2 TABLET(S): at 04:32

## 2021-07-27 RX ADMIN — Medication 2 TABLET(S): at 10:18

## 2021-07-27 RX ADMIN — Medication 5 MILLIGRAM(S): at 00:28
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
## 2021-07-27 NOTE — DISCHARGE NOTE PROVIDER - CARE PROVIDERS DIRECT ADDRESSES
[email or web address],DirectAddress_Unknown,DirectAddress_Unknown

## 2021-07-27 NOTE — DISCHARGE NOTE PROVIDER - PROVIDER TOKENS
PROVIDER:[TOKEN:[[id number]]] PROVIDER:[TOKEN:[[id number]]],PROVIDER:[TOKEN:[[id number]]],PROVIDER:[TOKEN:[[id number]]]
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
## 2021-07-27 NOTE — DISCHARGE NOTE PROVIDER - CARE PROVIDER_API CALL
Chepe Chandler)  Neurosurgery  [address] [place] [address]  Phone: [phone]  Fax: [phone]  Follow Up Time:    Chepe Chandler)  Neurosurgery  [address] [place] [address]  Phone: [phone]  Fax: [phone]  Follow Up Time:     Elida Vargas)  Plastic Surgery  [address] [place] [address]  Phone: [phone]  Fax: [phone]  Follow Up Time:     Kemal العلي)  Otolaryngology  [address] [place] [address]  Phone: [phone]  Fax: [phone]  Follow Up Time:

## 2021-07-27 NOTE — DISCHARGE NOTE NURSING/CASE MANAGEMENT/SOCIAL WORK - PATIENT PORTAL LINK FT
You can access the FollowMyHealth Patient Portal offered by [facility] by registering at the following website: [email or web address]. By joining [employer]’s FollowMyHealth portal, you will also be able to view your health information using other applications (apps) compatible with our system.
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
## 2021-07-27 NOTE — DISCHARGE NOTE PROVIDER - NSFOLLOWUPCLINICS_GEN_ALL_ED_FT
[facility]  Concussion  [address] [place] [address]  Phone: [phone]  Fax:

## 2021-07-27 NOTE — DISCHARGE NOTE PROVIDER - NSDCMRMEDTOKEN_GEN_ALL_CORE_FT
acetaminophen 500 mg oral tablet: 2 tab(s) orally every 6 hours, As Needed -for moderate pain  Flonase 50 mcg/inh nasal spray: 1 spray(s) intranasally 2 times a day   flunisolide 25 mcg/inh nasal spray: 2 puff(s) in each nostril 2 times a day

## 2021-07-27 NOTE — DISCHARGE NOTE PROVIDER - NSDCCPCAREPLAN_GEN_ALL_CORE_FT
PRINCIPAL DISCHARGE DIAGNOSIS  Diagnosis: Traumatic hemorrhage of right cerebrum without loss of consciousness, initial encounter  Assessment and Plan of Treatment: Follow up with neurosurgery as outpatient. Diet as tolerated. Please follow up with your primary care physician regarding your hospitalization. Return to ED if unable to perform usual activities of daily living, tolerate diet.       PRINCIPAL DISCHARGE DIAGNOSIS  Diagnosis: Traumatic hemorrhage of right cerebrum without loss of consciousness, initial encounter  Assessment and Plan of Treatment: Follow up with neurosurgery as outpatient. Diet as tolerated. Please follow up with your primary care physician regarding your hospitalization. Return to ED if unable to perform usual activities of daily living, tolerate diet.   Follow up with ENT, Plastic Surgery as well upon discharge.       PRINCIPAL DISCHARGE DIAGNOSIS  Diagnosis: Traumatic hemorrhage of right cerebrum without loss of consciousness, initial encounter  Assessment and Plan of Treatment: Follow up with neurosurgery as outpatient. Diet as tolerated. Please follow up with your primary care physician regarding your hospitalization. Return to ED if unable to perform usual activities of daily living, tolerate diet.   Follow up with ENT, Plastic Surgery as well upon discharge.  Craniofacial recs - non-operative management, no nose blowing

## 2021-07-29 ENCOUNTER — APPOINTMENT (OUTPATIENT)
Dept: PHYSICAL MEDICINE AND REHAB | Facility: CLINIC | Age: 26
End: 2021-07-29
Payer: MEDICAID

## 2021-07-29 DIAGNOSIS — G44.309 POST-TRAUMATIC HEADACHE, UNSPECIFIED, NOT INTRACTABLE: ICD-10-CM

## 2021-07-29 DIAGNOSIS — S06.9X9A UNSPECIFIED INTRACRANIAL INJURY WITH LOSS OF CONSCIOUSNESS OF UNSPECIFIED DURATION, INITIAL ENCOUNTER: ICD-10-CM

## 2021-07-29 PROCEDURE — 97167 OT EVAL HIGH COMPLEX 60 MIN: CPT

## 2021-07-29 PROCEDURE — 73130 X-RAY EXAM OF HAND: CPT

## 2021-07-29 PROCEDURE — 80053 COMPREHEN METABOLIC PANEL: CPT

## 2021-07-29 PROCEDURE — 86900 BLOOD TYPING SEROLOGIC ABO: CPT

## 2021-07-29 PROCEDURE — 70486 CT MAXILLOFACIAL W/O DYE: CPT | Mod: MG

## 2021-07-29 PROCEDURE — 85025 COMPLETE CBC W/AUTO DIFF WBC: CPT

## 2021-07-29 PROCEDURE — 36415 COLL VENOUS BLD VENIPUNCTURE: CPT

## 2021-07-29 PROCEDURE — 72125 CT NECK SPINE W/O DYE: CPT

## 2021-07-29 PROCEDURE — 86850 RBC ANTIBODY SCREEN: CPT

## 2021-07-29 PROCEDURE — 70496 CT ANGIOGRAPHY HEAD: CPT

## 2021-07-29 PROCEDURE — 96374 THER/PROPH/DIAG INJ IV PUSH: CPT

## 2021-07-29 PROCEDURE — 85730 THROMBOPLASTIN TIME PARTIAL: CPT

## 2021-07-29 PROCEDURE — 80048 BASIC METABOLIC PNL TOTAL CA: CPT

## 2021-07-29 PROCEDURE — 86769 SARS-COV-2 COVID-19 ANTIBODY: CPT

## 2021-07-29 PROCEDURE — 83735 ASSAY OF MAGNESIUM: CPT

## 2021-07-29 PROCEDURE — 86901 BLOOD TYPING SEROLOGIC RH(D): CPT

## 2021-07-29 PROCEDURE — U0003: CPT

## 2021-07-29 PROCEDURE — 85027 COMPLETE CBC AUTOMATED: CPT

## 2021-07-29 PROCEDURE — 84100 ASSAY OF PHOSPHORUS: CPT

## 2021-07-29 PROCEDURE — U0005: CPT

## 2021-07-29 PROCEDURE — 99285 EMERGENCY DEPT VISIT HI MDM: CPT

## 2021-07-29 PROCEDURE — 70450 CT HEAD/BRAIN W/O DYE: CPT | Mod: ME

## 2021-07-29 PROCEDURE — 85610 PROTHROMBIN TIME: CPT

## 2021-07-29 PROCEDURE — G1004: CPT

## 2021-07-29 PROCEDURE — 99204 OFFICE O/P NEW MOD 45 MIN: CPT | Mod: GC

## 2021-07-29 PROCEDURE — 96375 TX/PRO/DX INJ NEW DRUG ADDON: CPT

## 2021-07-29 NOTE — PHYSICAL EXAM
[Normal] : Normal skin color and pigmentation, normal turgor and no rash [FreeTextEntry1] : Neuro: AAOx3, impaired simple calculations, impaired immediate recall\par Oculovestibular: +saccades, impaired smooth pursuits with subjective dizziness\par Psychiatric: Mood is down\par \par Imaging Reviewed:\par Head CT 7/24: Suspected 1 cm acute parenchymal hemorrhage in the anterior lateral right temporal tip\par \par Maxillofacial CT 7/24: Nondisplaced fractures through the right mastoid air cells with scattered opacification.\par \par C-spine CT 7/24:  No acute fracture.\par \par Head CT 7/24: Stable 1 cm hemorrhagic contusion of the right anterior temporal lobe.\par Findings suggest edema at the base of the bilateral frontal lobes with associated patchy areas of intraparenchymal hemorrhage/hemorrhagic contusions, right greater than left. This is better demonstrated on the present exam. Recommend continued CT brain follow-up.\par Stable appearance of the right mastoid air cells which are partially opacified with suspected nondisplaced right mastoid fracture.\par \par Head CT 7/26: Slight interval increase in size of bilateral inferomedial frontal and right anterior temporal hemorrhagic parenchymal contusions.\par No hydrocephalus, midline shift, or effacement of basal cisterns.\par Redemonstration of nondisplaced right mastoid fracture and right mastoid air cell effusion.\par \par Head CT 7/26: No significant interval change from 7/26/2021.\par \par Head CTA 7/26: CTA: Patent vessels of the [place] and major branches without evidence of aneurysm or occlusion.\par \par Head CTV 7/26: Patent dural venous sinuses.
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
## 2021-07-29 NOTE — REVIEW OF SYSTEMS
[Patient Intake Form Reviewed] : Patient intake form was reviewed [Headache] : headache [Negative] : Integumentary [de-identified] : confusion [de-identified] : insomnia, irritability

## 2021-08-11 ENCOUNTER — APPOINTMENT (OUTPATIENT)
Dept: PHYSICAL MEDICINE AND REHAB | Facility: CLINIC | Age: 26
End: 2021-08-11

## 2021-09-23 ENCOUNTER — APPOINTMENT (OUTPATIENT)
Dept: PHYSICAL MEDICINE AND REHAB | Facility: CLINIC | Age: 26
End: 2021-09-23

## 2021-12-23 ENCOUNTER — EMERGENCY (EMERGENCY)
Facility: HOSPITAL | Age: 26
LOS: 0 days | Discharge: HOME | End: 2021-12-23
Attending: STUDENT IN AN ORGANIZED HEALTH CARE EDUCATION/TRAINING PROGRAM
Payer: MEDICAID

## 2021-12-23 VITALS
OXYGEN SATURATION: 96 % | TEMPERATURE: 98 F | SYSTOLIC BLOOD PRESSURE: 123 MMHG | HEART RATE: 94 BPM | DIASTOLIC BLOOD PRESSURE: 88 MMHG | RESPIRATION RATE: 16 BRPM | WEIGHT: 149.91 LBS | HEIGHT: 73 IN

## 2021-12-23 VITALS
OXYGEN SATURATION: 99 % | SYSTOLIC BLOOD PRESSURE: 125 MMHG | RESPIRATION RATE: 17 BRPM | DIASTOLIC BLOOD PRESSURE: 72 MMHG | TEMPERATURE: 98 F | HEART RATE: 91 BPM

## 2021-12-23 DIAGNOSIS — M25.522 PAIN IN LEFT ELBOW: ICD-10-CM

## 2021-12-23 DIAGNOSIS — Z23 ENCOUNTER FOR IMMUNIZATION: ICD-10-CM

## 2021-12-23 DIAGNOSIS — R51.9 HEADACHE, UNSPECIFIED: ICD-10-CM
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
## 2021-12-23 DIAGNOSIS — S61.212A LACERATION WITHOUT FOREIGN BODY OF RIGHT MIDDLE FINGER WITHOUT DAMAGE TO NAIL, INITIAL ENCOUNTER: ICD-10-CM
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
## 2021-12-23 DIAGNOSIS — M79.641 PAIN IN RIGHT HAND: ICD-10-CM

## 2021-12-23 PROCEDURE — 90471 IMMUNIZATION ADMIN: CPT

## 2021-12-23 PROCEDURE — 73130 X-RAY EXAM OF HAND: CPT | Mod: 26,RT

## 2021-12-23 PROCEDURE — 73080 X-RAY EXAM OF ELBOW: CPT | Mod: 26,LT

## 2021-12-23 PROCEDURE — 70450 CT HEAD/BRAIN W/O DYE: CPT | Mod: 26,MA

## 2021-12-23 PROCEDURE — 73130 X-RAY EXAM OF HAND: CPT | Mod: RT

## 2021-12-23 PROCEDURE — 73080 X-RAY EXAM OF ELBOW: CPT | Mod: LT

## 2021-12-23 PROCEDURE — 12001 RPR S/N/AX/GEN/TRNK 2.5CM/<: CPT

## 2021-12-23 PROCEDURE — 70450 CT HEAD/BRAIN W/O DYE: CPT | Mod: MA

## 2021-12-23 PROCEDURE — 99284 EMERGENCY DEPT VISIT MOD MDM: CPT | Mod: 25

## 2021-12-23 PROCEDURE — 99285 EMERGENCY DEPT VISIT HI MDM: CPT | Mod: 25

## 2021-12-23 PROCEDURE — 90715 TDAP VACCINE 7 YRS/> IM: CPT

## 2021-12-23 RX ORDER — TETANUS TOXOID, REDUCED DIPHTHERIA TOXOID AND ACELLULAR PERTUSSIS VACCINE, ADSORBED 5; 2.5; 8; 8; 2.5 [IU]/.5ML; [IU]/.5ML; UG/.5ML; UG/.5ML; UG/.5ML
0.5 SUSPENSION INTRAMUSCULAR ONCE
Refills: 0 | Status: COMPLETED | OUTPATIENT
Start: 2021-12-23 | End: 2021-12-23

## 2021-12-23 RX ORDER — CEPHALEXIN 500 MG
1 CAPSULE ORAL
Qty: 15 | Refills: 0
Start: 2021-12-23 | End: 2021-12-27

## 2021-12-23 RX ORDER — ACETAMINOPHEN 500 MG
1000 TABLET ORAL ONCE
Refills: 0 | Status: COMPLETED | OUTPATIENT
Start: 2021-12-23 | End: 2021-12-23

## 2021-12-23 RX ADMIN — TETANUS TOXOID, REDUCED DIPHTHERIA TOXOID AND ACELLULAR PERTUSSIS VACCINE, ADSORBED 0.5 MILLILITER(S): 5; 2.5; 8; 8; 2.5 SUSPENSION INTRAMUSCULAR at 13:17

## 2021-12-23 RX ADMIN — Medication 1000 MILLIGRAM(S): at 13:47

## 2021-12-23 RX ADMIN — Medication 1000 MILLIGRAM(S): at 13:17
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
## 2021-12-23 NOTE — ED STATDOCS - ATTENDING CONTRIBUTION TO CARE
I, Laney Chavez DO, personally saw the patient with ACP.  I have personally performed a face to face diagnostic evaluation on this patient and formulated the patient plan. The case was discussed with, and handed off to ACP who followed the case through to the re-evaluation and disposition.
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
## 2021-12-23 NOTE — ED STATDOCS - PHYSICAL EXAMINATION
Vital signs as available reviewed.  General:  Comfortable, no acute distress.  Head:  Normocephalic, atraumatic.  ENT: (+) Dried blood on L ear, no hemotympanum   Eyes:  Conjunctiva pink, no icterus. Pupils equal, round, reactive to light, extra-occular eye movements intact.  Cardiovascular:  Regular rate, no obvious murmur.  Respiratory:  Clear to auscultation, good air entry bilaterally.  Abdomen:  Soft, non-tender.  Musculoskeletal:  No tenderness to palpation over c/t/l spine. No tenderness to palpation over chest wall, clavicles, shoulders, hip/pelvis, upper / lower legs. (+) L elbow with posterior tenderness and swelling distal extremity neurovascularly intact ; R hand with TTP at 5th MCP over the entire 5th digit with abrasion to the 5th PIP and also with 3rd PIP abrasion and tenderness   Neurologic: Alert and oriented, moving all extremities. Sensation intact.  Skin:  Warm and dry. No skin break.

## 2021-12-23 NOTE — ED STATDOCS - NS ED ROS FT
Constitutional: No fever.  Eyes: No vision changes.   Ears, Nose, Mouth, Throat: No congestion.  Cardiovascular: No chest pain.  Respiratory: No difficulty breathing.  Gastrointestinal: No nausea . No vomiting.  Genitourinary: No dysuria.  Musculoskeletal: (+) R hand pain, L elbow pain   Neurological: (+) headache.  Integumentary (skin and/or breast): No rash.
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
## 2021-12-23 NOTE — ED STATDOCS - PATIENT PORTAL LINK FT
You can access the FollowMyHealth Patient Portal offered by [facility] by registering at the following website: [email or web address]. By joining [email or web address]’s FollowMyHealth portal, you will also be able to view your health information using other applications (apps) compatible with our system.

## 2022-03-28 ENCOUNTER — EMERGENCY (EMERGENCY)
Facility: HOSPITAL | Age: 27
LOS: 1 days | Discharge: HOME | End: 2022-03-28
Attending: STUDENT IN AN ORGANIZED HEALTH CARE EDUCATION/TRAINING PROGRAM
Payer: COMMERCIAL

## 2022-03-28 VITALS — TEMPERATURE: 97 F

## 2022-03-28 VITALS
TEMPERATURE: 98 F | RESPIRATION RATE: 18 BRPM | OXYGEN SATURATION: 99 % | HEART RATE: 79 BPM | DIASTOLIC BLOOD PRESSURE: 75 MMHG | SYSTOLIC BLOOD PRESSURE: 115 MMHG | HEIGHT: 73 IN

## 2022-03-28 PROCEDURE — 99285 EMERGENCY DEPT VISIT HI MDM: CPT | Mod: 25

## 2022-03-28 PROCEDURE — 82962 GLUCOSE BLOOD TEST: CPT

## 2022-03-28 PROCEDURE — 96374 THER/PROPH/DIAG INJ IV PUSH: CPT

## 2022-03-28 PROCEDURE — 99284 EMERGENCY DEPT VISIT MOD MDM: CPT

## 2022-03-28 RX ADMIN — Medication 4 MILLIGRAM(S): at 06:12
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
## 2022-03-28 NOTE — ED PROVIDER NOTE - MDM ORDERS SUBMITTED SELECTION
"Subjective:      Patient ID: Kristen Bhagat is a 67 y.o. female.    Chief Complaint: Dehydration, dry mouth, and Follow-up (3 month)      Vitals:    10/12/20 1044   BP: 138/60   Pulse: 86   Temp: 97.5 °F (36.4 °C)   TempSrc: Oral   SpO2: 98%   Weight: 73.6 kg (162 lb 5.9 oz)   Height: 5' 4" (1.626 m)        HPI   Check up; c/o dry mouth HS and in AM  Wants a lasix to take every now and then  Sugar good at home  bp low at home  Exercising, light headed and wooziness    Problem List  Patient Active Problem List   Diagnosis    Glaucoma    Type 2 diabetes mellitus without complication, without long-term current use of insulin    Hypertension        ALLERGIES:   Review of patient's allergies indicates:   Allergen Reactions    Amoxicillin        MEDS:   Current Outpatient Medications:     ACCU-CHEK ADAM CONTROL SOLN Soln, , Disp: , Rfl:     ACCU-CHEK ADAM PLUS TEST STRP Strp, , Disp: , Rfl:     ACCU-CHEK SOFT DEV LANCETS Kit, , Disp: , Rfl:     ACCU-CHEK SOFTCLIX LANCETS Misc, , Disp: , Rfl:     aspirin (ECOTRIN) 81 MG EC tablet, Take 1 tablet (81 mg total) by mouth once. for 1 dose, Disp: 100 tablet, Rfl: 12    atorvastatin (LIPITOR) 10 MG tablet, Take 1 tablet (10 mg total) by mouth once daily., Disp: 90 tablet, Rfl: 3    BD ALCOHOL SWABS PadM, , Disp: , Rfl:     cholecalciferol, vitamin D3, (VITAMIN D3) 125 mcg (5,000 unit) Tab, Take 1 tablet (5,000 Units total) by mouth once daily., Disp: 90 tablet, Rfl: 3    glimepiride (AMARYL) 4 MG tablet, Take 1 tablet (4 mg total) by mouth daily with breakfast., Disp: 90 tablet, Rfl: 3    latanoprost 0.005 % ophthalmic solution, , Disp: , Rfl:     lisinopriL (PRINIVIL,ZESTRIL) 5 MG tablet, Take 1 tablet (5 mg total) by mouth once daily., Disp: 90 tablet, Rfl: 3    metFORMIN (GLUCOPHAGE) 500 MG tablet, Take 2 tablets (1,000 mg total) by mouth 2 (two) times daily with meals., Disp: 360 tablet, Rfl: 3    blood-glucose meter kit, Use as instructed, Disp: 100 " each, Rfl: 3    fluticasone propionate (FLONASE) 50 mcg/actuation nasal spray, 2 sprays (100 mcg total) by Each Nostril route once daily., Disp: 18.2 mL, Rfl: 3    loratadine (CLARITIN) 10 mg tablet, Take 1 tablet (10 mg total) by mouth once daily., Disp: 90 tablet, Rfl: 3    pantoprazole (PROTONIX) 40 MG tablet, , Disp: , Rfl:       History:  Current Providers as of 10/12/2020  PCP: Primary Doctor No  Care Team Provider: Migue Pinedo MD  Care Team Provider: Federico Burris MD  Care Team Provider: Stewart Pierre Jr., MD  Encounter Provider: Kristopher Stover MD, starting on Mon Oct 12, 2020 12:00 AM  Referring Provider: not found, starting on Mon Oct 12, 2020 12:00 AM  Consulting Physician: Kristopher Stover MD, starting on Mon Oct 12, 2020 10:40 AM (Active)   Past Medical History:   Diagnosis Date    Diabetes mellitus, type 2     Esophageal stricture     Glaucoma     Hypertension      Past Surgical History:   Procedure Laterality Date    [facility]    ESOPHAGOGASTRODUODENOSCOPY      [facility] June 2018     Social History     Tobacco Use    Smoking status: Never Smoker    Smokeless tobacco: Never Used   Substance Use Topics    Alcohol use: No    Drug use: Not on file         Review of Systems   Constitutional: Negative.         Puffy whole body relieved with lasix and water   HENT: Negative.         Dry mouth   Respiratory: Negative.    Cardiovascular: Negative.    Gastrointestinal: Negative.    Endocrine: Negative.    Genitourinary: Negative.    Musculoskeletal: Negative.    Psychiatric/Behavioral: Negative.    All other systems reviewed and are negative.    Objective:     Physical Exam  Vitals signs and nursing note reviewed.   Constitutional:       Appearance: She is well-developed.   HENT:      Head: Normocephalic.   Eyes:      Conjunctiva/sclera: Conjunctivae normal.      Pupils: Pupils are equal, round, and reactive to light.   Neck:       Musculoskeletal: Normal range of motion and neck supple.   Cardiovascular:      Rate and Rhythm: Normal rate and regular rhythm.      Heart sounds: Normal heart sounds.   Pulmonary:      Effort: Pulmonary effort is normal.      Breath sounds: Normal breath sounds.   Musculoskeletal: Normal range of motion.   Skin:     General: Skin is warm and dry.   Neurological:      Mental Status: She is alert and oriented to person, place, and time.      Deep Tendon Reflexes: Reflexes are normal and symmetric.   Psychiatric:         Behavior: Behavior normal.         Thought Content: Thought content normal.         Judgment: Judgment normal.             Assessment:     1. Type 2 diabetes mellitus without complication, without long-term current use of insulin    2. Encounter for screening mammogram for malignant neoplasm of breast    3. Seasonal allergies    4. Dry mouth    5. Nasal obstruction      Plan:        Medication List          Accurate as of October 12, 2020 11:52 AM. If you have any questions, ask your nurse or doctor.            START taking these medications    fluticasone propionate 50 mcg/actuation nasal spray  Commonly known as: FLONASE  2 sprays (100 mcg total) by Each Nostril route once daily.  Started by: Kristopher Stover MD     loratadine 10 mg tablet  Commonly known as: CLARITIN  Take 1 tablet (10 mg total) by mouth once daily.  Started by: Kristopher Stover MD        CHANGE how you take these medications    lisinopriL 5 MG tablet  Commonly known as: PRINIVIL,ZESTRIL  Take 1 tablet (5 mg total) by mouth once daily.  What changed:   · medication strength  · how much to take  Changed by: Kristopher Stover MD        CONTINUE taking these medications    ACCU-CHEK ADAM CONTROL SOLN Soln  Generic drug: blood glucose control high,low     ACCU-CHEK ADAM PLUS TEST STRP Strp  Generic drug: blood sugar diagnostic     ACCU-CHEK SOFT DEV LANCETS Kit  Generic drug: lancing device with lancets     ACCU-CHEK SOFTCLIX LANCETS  Misc  Generic drug: lancets     aspirin 81 MG EC tablet  Commonly known as: ECOTRIN  Take 1 tablet (81 mg total) by mouth once. for 1 dose     atorvastatin 10 MG tablet  Commonly known as: LIPITOR  Take 1 tablet (10 mg total) by mouth once daily.     BD ALCOHOL SWABS Padm  Generic drug: alcohol swabs     blood-glucose meter kit  Use as instructed     cholecalciferol (vitamin D3) 125 mcg (5,000 unit) Tab  Commonly known as: VITAMIN D3  Take 1 tablet (5,000 Units total) by mouth once daily.     glimepiride 4 MG tablet  Commonly known as: AMARYL  Take 1 tablet (4 mg total) by mouth daily with breakfast.     latanoprost 0.005 % ophthalmic solution     metFORMIN 500 MG tablet  Commonly known as: GLUCOPHAGE  Take 2 tablets (1,000 mg total) by mouth 2 (two) times daily with meals.     pantoprazole 40 MG tablet  Commonly known as: PROTONIX           Where to Get Your Medications      These medications were sent to [facility] [address] [place] [address] [place] [id number]  [facility] [address]    Phone: [phone]   · fluticasone propionate 50 mcg/actuation nasal spray  · lisinopriL 5 MG tablet     You can get these medications from any pharmacy    You don't need a prescription for these medications  · loratadine 10 mg tablet       Type 2 diabetes mellitus without complication, without long-term current use of insulin  -     Hemoglobin A1C; Future    Encounter for screening mammogram for malignant neoplasm of breast  -     Mammo Digital Screening Bilat w/ Luke; Future; Expected date: 10/12/2020    Seasonal allergies    Dry mouth    Nasal obstruction    Other orders  -     lisinopriL (PRINIVIL,ZESTRIL) 5 MG tablet; Take 1 tablet (5 mg total) by mouth once daily.  Dispense: 90 tablet; Refill: 3  -     fluticasone propionate (FLONASE) 50 mcg/actuation nasal spray; 2 sprays (100 mcg total) by Each Nostril route once daily.  Dispense: 18.2 mL; Refill: 3  -     loratadine (CLARITIN) 10 mg tablet; Take 1 tablet  (10 mg total) by mouth once daily.  Dispense: 90 tablet; Refill: 3           Not Applicable
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
## 2022-03-28 NOTE — ED PROVIDER NOTE - PROGRESS NOTE DETAILS
Pt seen initially by Dr. Singh, at this time resting comfortably, will await for clinical sobriety  General: no signs of trauma, somnolent, in no apparent distress.  Head: AT, NC  HEENT: Airway patent., no lemus sign, no raccoon eyes, no hemotympanum   Eyes: clear bilaterally, pupils equal, round and reactive to light.  Cardiac: Normal rate, regular rhythm.  Heart sounds S1, S2.    Respiratory: Breath sounds clear and equal bilaterally.  Abdomen soft, non-tender, no guarding.  MSK: moving all extremities x 4  Neuro: somnolent  Skin: normal color. Patient seen and reassessed.  Patient states he drank alcohol and was coming down from all the felton he took over the weekend. Patient is AAOX3, NAD, able to ambulate, non tremulous, and tolerating oral intake.   VSS.   Patient states that they feel safe going home and have a safe way to get home.    Discussed need to cut down on alcohol intake, given literature.

## 2022-03-28 NOTE — ED PROVIDER NOTE - NSFOLLOWUPINSTRUCTIONS_ED_ALL_ED_FT
Alcohol Abuse    Alcohol intoxication occurs when the amount of alcohol that a person has consumed impairs his or her ability to mentally and physically function. Chronic alcohol consumption can also lead to a variety of health issues including neurological disease, stomach disease, heart disease, liver disease, etc. Do not drive after drinking alcohol. Drinking enough alcohol to end up in an Emergency Room suggests you may have an alcohol abuse problem. Seek help at a drug addiction center.    SEEK IMMEDIATE MEDICAL CARE IF YOU HAVE ANY OF THE FOLLOWING SYMPTOMS: seizures, vomiting blood, blood in your stool, lightheadedness/dizziness, or becoming shaky to tremulous when you stop drinking.    -Please follow-up with your primary care doctor in the next 2 days.  Please call tomorrow for an appointment.  If you cannot follow-up with your primary care doctor please return to the ED for any urgent issues.  - You were given a copy of the tests performed today.  Please bring the results with you and review them with your primary care doctor.  - If you have any worsening of symptoms or any other concerns please return to the ED immediately.  - Please continue taking your home medications as directed.
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
## 2022-03-28 NOTE — ED PROVIDER NOTE - PATIENT PORTAL LINK FT
You can access the FollowMyHealth Patient Portal offered by [facility] by registering at the following website: [email or web address]. By joining [employer]’s FollowMyHealth portal, you will also be able to view your health information using other applications (apps) compatible with our system.
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
## 2022-07-01 ENCOUNTER — EMERGENCY (EMERGENCY)
Facility: HOSPITAL | Age: 27
LOS: 0 days | Discharge: HOME | End: 2022-07-01
Attending: EMERGENCY MEDICINE
Payer: MEDICAID

## 2022-07-01 ENCOUNTER — TRANSCRIPTION ENCOUNTER (OUTPATIENT)
Age: 27
End: 2022-07-01

## 2022-07-01 VITALS
HEART RATE: 78 BPM | SYSTOLIC BLOOD PRESSURE: 117 MMHG | TEMPERATURE: 98 F | DIASTOLIC BLOOD PRESSURE: 72 MMHG | RESPIRATION RATE: 16 BRPM | OXYGEN SATURATION: 98 %

## 2022-07-01 VITALS — HEIGHT: 73 IN | WEIGHT: 160.06 LBS

## 2022-07-01 DIAGNOSIS — M79.10 MYALGIA, UNSPECIFIED SITE: ICD-10-CM

## 2022-07-01 DIAGNOSIS — R19.7 DIARRHEA, UNSPECIFIED: ICD-10-CM

## 2022-07-01 DIAGNOSIS — J02.9 ACUTE PHARYNGITIS, UNSPECIFIED: ICD-10-CM

## 2022-07-01 DIAGNOSIS — F17.210 NICOTINE DEPENDENCE, CIGARETTES, UNCOMPLICATED: ICD-10-CM

## 2022-07-01 DIAGNOSIS — R07.9 CHEST PAIN, UNSPECIFIED: ICD-10-CM

## 2022-07-01 DIAGNOSIS — F17.290 NICOTINE DEPENDENCE, OTHER TOBACCO PRODUCT, UNCOMPLICATED: ICD-10-CM

## 2022-07-01 DIAGNOSIS — Z20.822 CONTACT WITH AND (SUSPECTED) EXPOSURE TO COVID-19: ICD-10-CM

## 2022-07-01 DIAGNOSIS — R50.9 FEVER, UNSPECIFIED: ICD-10-CM

## 2022-07-01 LAB
ALBUMIN SERPL ELPH-MCNC: 3.8 G/DL — SIGNIFICANT CHANGE UP (ref 3.3–5)
ALP SERPL-CCNC: 119 U/L — SIGNIFICANT CHANGE UP (ref 40–120)
ALT FLD-CCNC: 261 U/L — HIGH (ref 12–78)
ANION GAP SERPL CALC-SCNC: 8 MMOL/L — SIGNIFICANT CHANGE UP (ref 5–17)
AST SERPL-CCNC: 187 U/L — HIGH (ref 15–37)
BASOPHILS # BLD AUTO: 0.06 K/UL — SIGNIFICANT CHANGE UP (ref 0–0.2)
BASOPHILS NFR BLD AUTO: 0.9 % — SIGNIFICANT CHANGE UP (ref 0–2)
BILIRUB SERPL-MCNC: 0.1 MG/DL — LOW (ref 0.2–1.2)
BUN SERPL-MCNC: 7 MG/DL — SIGNIFICANT CHANGE UP (ref 7–23)
CALCIUM SERPL-MCNC: 9.3 MG/DL — SIGNIFICANT CHANGE UP (ref 8.5–10.1)
CHLORIDE SERPL-SCNC: 108 MMOL/L — SIGNIFICANT CHANGE UP (ref 96–108)
CO2 SERPL-SCNC: 25 MMOL/L — SIGNIFICANT CHANGE UP (ref 22–31)
CREAT SERPL-MCNC: 0.66 MG/DL — SIGNIFICANT CHANGE UP (ref 0.5–1.3)
EGFR: 132 ML/MIN/1.73M2 — SIGNIFICANT CHANGE UP
EOSINOPHIL # BLD AUTO: 0.25 K/UL — SIGNIFICANT CHANGE UP (ref 0–0.5)
EOSINOPHIL NFR BLD AUTO: 3.6 % — SIGNIFICANT CHANGE UP (ref 0–6)
GLUCOSE SERPL-MCNC: 88 MG/DL — SIGNIFICANT CHANGE UP (ref 70–99)
HCT VFR BLD CALC: 39.8 % — SIGNIFICANT CHANGE UP (ref 39–50)
HGB BLD-MCNC: 13.4 G/DL — SIGNIFICANT CHANGE UP (ref 13–17)
HIV 1 & 2 AB SERPL IA.RAPID: SIGNIFICANT CHANGE UP
IMM GRANULOCYTES NFR BLD AUTO: 0.3 % — SIGNIFICANT CHANGE UP (ref 0–1.5)
LYMPHOCYTES # BLD AUTO: 1.84 K/UL — SIGNIFICANT CHANGE UP (ref 1–3.3)
LYMPHOCYTES # BLD AUTO: 26.8 % — SIGNIFICANT CHANGE UP (ref 13–44)
MCHC RBC-ENTMCNC: 31.2 PG — SIGNIFICANT CHANGE UP (ref 27–34)
MCHC RBC-ENTMCNC: 33.7 GM/DL — SIGNIFICANT CHANGE UP (ref 32–36)
MCV RBC AUTO: 92.8 FL — SIGNIFICANT CHANGE UP (ref 80–100)
MONOCYTES # BLD AUTO: 0.46 K/UL — SIGNIFICANT CHANGE UP (ref 0–0.9)
MONOCYTES NFR BLD AUTO: 6.7 % — SIGNIFICANT CHANGE UP (ref 2–14)
NEUTROPHILS # BLD AUTO: 4.23 K/UL — SIGNIFICANT CHANGE UP (ref 1.8–7.4)
NEUTROPHILS NFR BLD AUTO: 61.7 % — SIGNIFICANT CHANGE UP (ref 43–77)
PLATELET # BLD AUTO: 260 K/UL — SIGNIFICANT CHANGE UP (ref 150–400)
POTASSIUM SERPL-MCNC: 4.1 MMOL/L — SIGNIFICANT CHANGE UP (ref 3.5–5.3)
POTASSIUM SERPL-SCNC: 4.1 MMOL/L — SIGNIFICANT CHANGE UP (ref 3.5–5.3)
PROT SERPL-MCNC: 7.5 GM/DL — SIGNIFICANT CHANGE UP (ref 6–8.3)
RBC # BLD: 4.29 M/UL — SIGNIFICANT CHANGE UP (ref 4.2–5.8)
RBC # FLD: 12.6 % — SIGNIFICANT CHANGE UP (ref 10.3–14.5)
SARS-COV-2 RNA SPEC QL NAA+PROBE: SIGNIFICANT CHANGE UP
SODIUM SERPL-SCNC: 141 MMOL/L — SIGNIFICANT CHANGE UP (ref 135–145)
WBC # BLD: 6.86 K/UL — SIGNIFICANT CHANGE UP (ref 3.8–10.5)
WBC # FLD AUTO: 6.86 K/UL — SIGNIFICANT CHANGE UP (ref 3.8–10.5)

## 2022-07-01 PROCEDURE — U0003: CPT

## 2022-07-01 PROCEDURE — 36415 COLL VENOUS BLD VENIPUNCTURE: CPT

## 2022-07-01 PROCEDURE — 86703 HIV-1/HIV-2 1 RESULT ANTBDY: CPT

## 2022-07-01 PROCEDURE — U0005: CPT

## 2022-07-01 PROCEDURE — 93010 ELECTROCARDIOGRAM REPORT: CPT

## 2022-07-01 PROCEDURE — 99284 EMERGENCY DEPT VISIT MOD MDM: CPT

## 2022-07-01 PROCEDURE — 80053 COMPREHEN METABOLIC PANEL: CPT

## 2022-07-01 PROCEDURE — 71045 X-RAY EXAM CHEST 1 VIEW: CPT

## 2022-07-01 PROCEDURE — 71045 X-RAY EXAM CHEST 1 VIEW: CPT | Mod: 26

## 2022-07-01 PROCEDURE — 93005 ELECTROCARDIOGRAM TRACING: CPT

## 2022-07-01 PROCEDURE — 99285 EMERGENCY DEPT VISIT HI MDM: CPT | Mod: 25

## 2022-07-01 PROCEDURE — 85025 COMPLETE CBC W/AUTO DIFF WBC: CPT

## 2022-07-01 RX ORDER — SODIUM CHLORIDE 9 MG/ML
1000 INJECTION INTRAMUSCULAR; INTRAVENOUS; SUBCUTANEOUS ONCE
Refills: 0 | Status: COMPLETED | OUTPATIENT
Start: 2022-07-01 | End: 2022-07-01

## 2022-07-01 RX ADMIN — SODIUM CHLORIDE 1000 MILLILITER(S): 9 INJECTION INTRAMUSCULAR; INTRAVENOUS; SUBCUTANEOUS at 12:43

## 2022-07-01 NOTE — ED STATDOCS - PROGRESS NOTE DETAILS
25 yo male with a past social history of smoking 1 pack every 4-5 days, +georgiana petersen and drinking daily presents with not feeling well like himself for the last 3 days with 27 yo male with a past social history of smoking 1 pack every 4-5 days, +marijuana use and drinking daily presents with not feeling well like himself for the last 3 days with associated sore throat and chest pain. P is covid vaccinated but no booster. Will check labs, IVF, and reeval. -Javier Castillo PA-C Pt feeling better. Discussed results with him including the slightly elevated LFTs that he should have repeated with his PMD. Informed him that it could be elevated from etoh consumption and to minimize that amount he drinks. Offered SBIRT consultation for him; however, pt refused. Pt to be d/c home to f/u with his pmd for further evaluation. -Javier Castillo PA-C

## 2022-07-01 NOTE — ED STATDOCS - NSFOLLOWUPINSTRUCTIONS_ED_ALL_ED_FT
Follow up with your primary care doctor for further evaluation of your symptoms and to repeat the liver functions tests.   Rest  Drink plenty of fluids and minimize alcohol consumptions.     Return to the ER for any new or other concerns.

## 2022-07-01 NOTE — ED ADULT NURSE NOTE - OBJECTIVE STATEMENT
Pt presented to the ER with c/o chest pain radiating to back last night. Pt also c/o body aches and fatigue for past 2-3 days and just not feeling "not himself". Per pt he stated that he usually has a lot on energy. Pt also c/o night sweats and generalized body aches. Pt denies any N/V, diarrhea, or SOB

## 2022-07-01 NOTE — ED ADULT NURSE NOTE - NSIMPLEMENTINTERV_GEN_ALL_ED
Implemented All Universal Safety Interventions:  [place] to call system. Call bell, personal items and telephone within reach. Instruct patient to call for assistance. Room bathroom lighting operational. Non-slip footwear when patient is off stretcher. Physically safe environment: no spills, clutter or unnecessary equipment. Stretcher in lowest position, wheels locked, appropriate side rails in place.

## 2022-07-01 NOTE — ED STATDOCS - OBJECTIVE STATEMENT
25 y/o male with no significant PMHx presents to the ED c/o 3 days of "not feeling himself, having body aches, cp last night, ?fever, diarrhea, cold feet. +smokes tobacco, smokes marijuana, drinks etoh daily. Denies sore throat but relates having mucus. No home tests for COVID. COVID vaccinated x2.

## 2022-07-01 NOTE — ED STATDOCS - NS ED ROS FT
Constitutional: +fever, +cold feet  Eyes: No visual changes  HEENT: No throat pain  CV: +chest pain  Resp: No SOB no cough  GI: No abd pain, nausea or vomiting. +diarrhea  : No dysuria  MSK: No musculoskeletal pain. +body aches  Skin: No rash  Neuro: No headache

## 2022-07-01 NOTE — ED STATDOCS - NS ED ATTENDING STATEMENT MOD
I have seen and examined this patient and fully participated in the care of this patient as the teaching attending.  The service was shared with the TYLOR.  I reviewed and verified the documentation and independently performed the documented:

## 2022-07-01 NOTE — ED STATDOCS - PATIENT PORTAL LINK FT
You can access the FollowMyHealth Patient Portal offered by [facility] by registering at the following website: [email or web address]. By joining [employer]’s FollowMyHealth portal, you will also be able to view your health information using other applications (apps) compatible with our system.

## 2022-07-01 NOTE — ED STATDOCS - PHYSICAL EXAMINATION
Constitutional: NAD AOx3  Eyes: PERRL EOMI  Head: Normocephalic atraumatic  Mouth: MMM  Cardiac: regular rate and rhythm  Resp: Lungs CTAB  GI: Abd s/nd/nt  Neuro: CN2-12 grossly intact, CHATMAN x 4  Skin: No visible rashes

## 2022-07-01 NOTE — ED ADULT TRIAGE NOTE - CHIEF COMPLAINT QUOTE
c/o chest pain radiating to back last night, c/o body aches and fatigue for past 2-3 days, c/o abdominal pain for past 3-4 days, denies fever, denies SOB Hx; denies, o2 sat in triage 95% on RAluther 98

## 2022-08-03 NOTE — ED ADULT NURSE NOTE - TEMPLATE
2nd attempt today- left message to return call.     Please route to RN queue for triage when she returns call.    SKINNY LockhartN, RN  [facility]      Symptoms

## 2022-09-07 NOTE — ED PROVIDER NOTE - CONDITION AT DISCHARGE:
----- Message from Renetta Lowe sent at 9/7/2022  2:11 PM CDT -----  Pt would like to schedule an appt. Call back number is [phone]. Thx. EL    
Nurse spoke with pt in regards to scheduling an appt, pt stated she will be a self referral, nurse informed pt a message will be sent to the navigator in regards to obtaining records and scheduling. Verbalized understanding  
Improved

## 2023-02-16 NOTE — DISCHARGE NOTE PROVIDER - NSFOLLOWUPCLINICSTOKEN_GEN_ALL_ED_FT
Prescription approved per [facility] Refill Protocol.    Zee Wilkes RN    
[id number]: || ||00\01||False;

## 2023-02-24 ENCOUNTER — APPOINTMENT (OUTPATIENT)
Dept: CARDIOLOGY | Facility: CLINIC | Age: 28
End: 2023-02-24

## 2023-02-27 ENCOUNTER — APPOINTMENT (OUTPATIENT)
Dept: CARDIOLOGY | Facility: CLINIC | Age: 28
End: 2023-02-27
Payer: MEDICAID

## 2023-02-27 ENCOUNTER — NON-APPOINTMENT (OUTPATIENT)
Age: 28
End: 2023-02-27

## 2023-02-27 VITALS
BODY MASS INDEX: 22.22 KG/M2 | HEIGHT: 69 IN | HEART RATE: 73 BPM | OXYGEN SATURATION: 97 % | WEIGHT: 150 LBS | SYSTOLIC BLOOD PRESSURE: 122 MMHG | DIASTOLIC BLOOD PRESSURE: 72 MMHG

## 2023-02-27 DIAGNOSIS — F41.9 ANXIETY DISORDER, UNSPECIFIED: ICD-10-CM

## 2023-02-27 DIAGNOSIS — F17.200 NICOTINE DEPENDENCE, UNSPECIFIED, UNCOMPLICATED: ICD-10-CM

## 2023-02-27 DIAGNOSIS — R07.89 OTHER CHEST PAIN: ICD-10-CM

## 2023-02-27 DIAGNOSIS — Z82.49 FAMILY HISTORY OF ISCHEMIC HEART DISEASE AND OTHER DISEASES OF THE CIRCULATORY SYSTEM: ICD-10-CM

## 2023-02-27 PROCEDURE — 99203 OFFICE O/P NEW LOW 30 MIN: CPT | Mod: 25

## 2023-02-27 PROCEDURE — 99406 BEHAV CHNG SMOKING 3-10 MIN: CPT

## 2023-02-27 PROCEDURE — 93000 ELECTROCARDIOGRAM COMPLETE: CPT | Mod: 59

## 2023-02-27 RX ORDER — NORTRIPTYLINE HYDROCHLORIDE 10 MG/1
10 CAPSULE ORAL
Qty: 30 | Refills: 1 | Status: DISCONTINUED | COMMUNITY
Start: 2021-07-29 | End: 2023-02-27

## 2023-02-27 NOTE — DISCUSSION/SUMMARY
[FreeTextEntry1] : Patient with above hx \par \par \par atypical chest pain migrating ? costochondritis based on location of pain  recommended to take motrin 400 mg po TID for 4 days with food , echo to assess ventricular function , exercise stress test .\par \par
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
## 2023-02-27 NOTE — REVIEW OF SYSTEMS
[Chest Discomfort] : chest discomfort [Negative] : Heme/Lymph [SOB] : no shortness of breath [Dyspnea on exertion] : not dyspnea during exertion [Leg Claudication] : no intermittent leg claudication [Palpitations] : no palpitations [Syncope] : no syncope [Cough] : no cough [Nausea] : no nausea [Change in Appetite] : no change in appetite [Change In The Stool] : no change in stool [Constipation] : no constipation

## 2023-04-20 ENCOUNTER — APPOINTMENT (OUTPATIENT)
Dept: CARDIOLOGY | Facility: CLINIC | Age: 28
End: 2023-04-20

## 2023-04-26 ENCOUNTER — APPOINTMENT (OUTPATIENT)
Dept: CARDIOLOGY | Facility: CLINIC | Age: 28
End: 2023-04-26

## 2023-04-30 NOTE — REVIEW OF SYSTEMS
[SOB] : no shortness of breath [Dyspnea on exertion] : not dyspnea during exertion [Chest Discomfort] : chest discomfort [Leg Claudication] : no intermittent leg claudication [Palpitations] : no palpitations [Syncope] : no syncope [Cough] : no cough [Nausea] : no nausea [Change in Appetite] : no change in appetite [Change In The Stool] : no change in stool [Constipation] : no constipation [Negative] : Heme/Lymph
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
## 2023-05-01 ENCOUNTER — APPOINTMENT (OUTPATIENT)
Dept: CARDIOLOGY | Facility: CLINIC | Age: 28
End: 2023-05-01

## 2023-05-27 ENCOUNTER — EMERGENCY (EMERGENCY)
Facility: HOSPITAL | Age: 28
LOS: 0 days | Discharge: HOME | End: 2023-05-27
Attending: STUDENT IN AN ORGANIZED HEALTH CARE EDUCATION/TRAINING PROGRAM
Payer: MEDICAID

## 2023-05-27 VITALS
SYSTOLIC BLOOD PRESSURE: 112 MMHG | DIASTOLIC BLOOD PRESSURE: 72 MMHG | OXYGEN SATURATION: 98 % | HEART RATE: 106 BPM | TEMPERATURE: 98 F | RESPIRATION RATE: 17 BRPM

## 2023-05-27 VITALS
SYSTOLIC BLOOD PRESSURE: 138 MMHG | RESPIRATION RATE: 16 BRPM | HEART RATE: 104 BPM | TEMPERATURE: 98 F | OXYGEN SATURATION: 99 % | DIASTOLIC BLOOD PRESSURE: 97 MMHG

## 2023-05-27 DIAGNOSIS — F10.129 ALCOHOL ABUSE WITH INTOXICATION, UNSPECIFIED: ICD-10-CM

## 2023-05-27 DIAGNOSIS — F19.10 OTHER PSYCHOACTIVE SUBSTANCE ABUSE, UNCOMPLICATED: ICD-10-CM

## 2023-05-27 LAB — GLUCOSE BLDC GLUCOMTR-MCNC: 95 MG/DL — SIGNIFICANT CHANGE UP (ref 70–99)

## 2023-05-27 PROCEDURE — 99285 EMERGENCY DEPT VISIT HI MDM: CPT

## 2023-05-27 PROCEDURE — 82962 GLUCOSE BLOOD TEST: CPT

## 2023-05-27 PROCEDURE — 99283 EMERGENCY DEPT VISIT LOW MDM: CPT
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
## 2023-05-27 NOTE — ED ADULT NURSE NOTE - NSFALLRISKINTERV_ED_ALL_ED
Assistance OOB with selected safe patient handling equipment if applicable/Assistance with ambulation/Communicate fall risk and risk factors to all staff, patient, and family/Monitor gait and stability/Monitor for mental status changes and reorient to person, place, and time, as needed/Provide visual cue: yellow wristband, yellow gown, etc/Reinforce activity limits and safety measures with patient and family/Toileting schedule using arm’s reach rule for commode and bathroom/Use of alarms - bed, stretcher, chair and/or video monitoring/Call bell, personal items and telephone in reach/Instruct patient to call for assistance before getting out of bed/chair/stretcher/Non-slip footwear applied when patient is off stretcher/[place] to call system/Physically safe environment - no spills, clutter or unnecessary equipment/Purposeful Proactive Rounding/Room/bathroom lighting operational, light cord in reach

## 2023-05-27 NOTE — ED PROVIDER NOTE - PATIENT PORTAL LINK FT
You can access the FollowMyHealth Patient Portal offered by [facility] by registering at the following website: [email or web address]. By joining [employer]’s FollowMyHealth portal, you will also be able to view your health information using other applications (apps) compatible with our system.

## 2023-05-27 NOTE — ED ADULT NURSE NOTE - NSFALLCONCLUSION_ED_ALL_ED
Patient: Linda Naqvi    Procedure Summary     Date: 04/07/22 Room / Location: [facility] OR  / [facility]    Anesthesia Start: 0909 Anesthesia Stop: 0944    Procedure: HEMATOMA EVACUATION LEFT LOWER BACK (N/A ) Diagnosis:       Lipoma of torso      (Lipoma of torso [D17.1])    Surgeons: David Bruner MD Provider: Latonya Anguiano DO    Anesthesia Type: general ASA Status: 2          Anesthesia Type: general    Vitals  Vitals Value Taken Time   /66 04/07/22 0952   Temp 36.1 °C (96.9 °F) 04/07/22 0942   Pulse 84 04/07/22 0954   Resp 16 04/07/22 0946   SpO2 97 % 04/07/22 0954   Vitals shown include unvalidated device data.        Post Anesthesia Care and Evaluation    Patient location during evaluation: bedside  Patient participation: complete - patient participated  Level of consciousness: awake  Pain management: adequate  Airway patency: patent  Anesthetic complications: No anesthetic complications  PONV Status: none  Cardiovascular status: acceptable and stable  Respiratory status: acceptable  Hydration status: acceptable    Comments: An Anesthesiologist personally participated in the most demanding procedures (including induction and emergence if applicable) in the anesthesia plan, monitored the course of anesthesia administration at frequent intervals and remained physically present and available for immediate diagnosis and treatment of emergencies.             Fall Risk

## 2023-05-27 NOTE — ED PROVIDER NOTE - PHYSICAL EXAMINATION
Constitutional: appears intoxicated, awake, able to answer questions but continuously dozing off   Eyes: PERRLA EOMI  Head: Normocephalic atraumatic  Mouth: MMM  Cardiac: regular rate   Resp: Lungs CTAB  GI: Abd s/nt/nd  Neuro: CN2-12 intact  Skin: No visible rashes

## 2023-05-27 NOTE — ED PROVIDER NOTE - PROGRESS NOTE DETAILS
Patient agrees to go to Drug rehab program in [place] and he will call them this week. The patient is clinically sober. The patient is alert and oriented x 3, is clear and coherent in conversation and has a normal gait and shows no signs of acute intoxication. The patient is safe for discharge.

## 2023-05-27 NOTE — ED PROVIDER NOTE - CLINICAL SUMMARY MEDICAL DECISION MAKING FREE TEXT BOX
adult male comes into ED stating he drank, beer, liquor, smoked meth, snorted cocaine. denies fall, traumas or head injury. vitals are normal. will reassess once sober. will check blood sugar.

## 2023-05-27 NOTE — ED PROVIDER NOTE - OBJECTIVE STATEMENT
26 y/o M w/ no pertinent PMHx presents to ED s/p EtOH and cocaine use. pt reports EtOH abuse daily. last night, pt drank liquor/beer and snorted cocaine. pt initially reported meth use last night, but when asked again to clarify he denies. states when he uses meth it's via smoking.  pt c/o numbness in finger tips. he is unsure if he fell today. hx limited secondary to intoxication.

## 2023-07-09 ENCOUNTER — EMERGENCY (EMERGENCY)
Facility: HOSPITAL | Age: 28
LOS: 1 days | Discharge: HOME | End: 2023-07-09
Attending: EMERGENCY MEDICINE
Payer: COMMERCIAL

## 2023-07-09 VITALS
DIASTOLIC BLOOD PRESSURE: 86 MMHG | RESPIRATION RATE: 18 BRPM | OXYGEN SATURATION: 96 % | HEART RATE: 92 BPM | WEIGHT: 139.99 LBS | SYSTOLIC BLOOD PRESSURE: 124 MMHG

## 2023-07-09 VITALS
RESPIRATION RATE: 18 BRPM | HEART RATE: 90 BPM | TEMPERATURE: 98 F | OXYGEN SATURATION: 97 % | SYSTOLIC BLOOD PRESSURE: 101 MMHG | DIASTOLIC BLOOD PRESSURE: 56 MMHG

## 2023-07-09 LAB
ALBUMIN SERPL ELPH-MCNC: 4.6 G/DL — SIGNIFICANT CHANGE UP (ref 3.3–5.2)
ALP SERPL-CCNC: 62 U/L — SIGNIFICANT CHANGE UP (ref 40–120)
ALT FLD-CCNC: 24 U/L — SIGNIFICANT CHANGE UP
AMPHET UR-MCNC: NEGATIVE — SIGNIFICANT CHANGE UP
ANION GAP SERPL CALC-SCNC: 14 MMOL/L — SIGNIFICANT CHANGE UP (ref 5–17)
APAP SERPL-MCNC: <3 UG/ML — LOW (ref 10–26)
APPEARANCE UR: CLEAR — SIGNIFICANT CHANGE UP
AST SERPL-CCNC: 45 U/L — HIGH
BARBITURATES UR SCN-MCNC: NEGATIVE — SIGNIFICANT CHANGE UP
BENZODIAZ UR-MCNC: NEGATIVE — SIGNIFICANT CHANGE UP
BILIRUB SERPL-MCNC: <0.2 MG/DL — LOW (ref 0.4–2)
BILIRUB UR-MCNC: NEGATIVE — SIGNIFICANT CHANGE UP
BUN SERPL-MCNC: 7.2 MG/DL — LOW (ref 8–20)
CALCIUM SERPL-MCNC: 8.7 MG/DL — SIGNIFICANT CHANGE UP (ref 8.4–10.5)
CHLORIDE SERPL-SCNC: 103 MMOL/L — SIGNIFICANT CHANGE UP (ref 96–108)
CO2 SERPL-SCNC: 27 MMOL/L — SIGNIFICANT CHANGE UP (ref 22–29)
COCAINE METAB.OTHER UR-MCNC: NEGATIVE — SIGNIFICANT CHANGE UP
COLOR SPEC: YELLOW — SIGNIFICANT CHANGE UP
CREAT SERPL-MCNC: 0.63 MG/DL — SIGNIFICANT CHANGE UP (ref 0.5–1.3)
DIFF PNL FLD: NEGATIVE — SIGNIFICANT CHANGE UP
EGFR: 133 ML/MIN/1.73M2 — SIGNIFICANT CHANGE UP
ETHANOL SERPL-MCNC: 323 MG/DL — HIGH (ref 0–9)
GLUCOSE SERPL-MCNC: 88 MG/DL — SIGNIFICANT CHANGE UP (ref 70–99)
GLUCOSE UR QL: NEGATIVE MG/DL — SIGNIFICANT CHANGE UP
HCT VFR BLD CALC: 38.7 % — LOW (ref 39–50)
HGB BLD-MCNC: 13.1 G/DL — SIGNIFICANT CHANGE UP (ref 13–17)
KETONES UR-MCNC: NEGATIVE — SIGNIFICANT CHANGE UP
LEUKOCYTE ESTERASE UR-ACNC: NEGATIVE — SIGNIFICANT CHANGE UP
MCHC RBC-ENTMCNC: 30.3 PG — SIGNIFICANT CHANGE UP (ref 27–34)
MCHC RBC-ENTMCNC: 33.9 GM/DL — SIGNIFICANT CHANGE UP (ref 32–36)
MCV RBC AUTO: 89.4 FL — SIGNIFICANT CHANGE UP (ref 80–100)
METHADONE UR-MCNC: NEGATIVE — SIGNIFICANT CHANGE UP
NITRITE UR-MCNC: NEGATIVE — SIGNIFICANT CHANGE UP
OPIATES UR-MCNC: NEGATIVE — SIGNIFICANT CHANGE UP
PCP SPEC-MCNC: SIGNIFICANT CHANGE UP
PCP UR-MCNC: NEGATIVE — SIGNIFICANT CHANGE UP
PH UR: 7 — SIGNIFICANT CHANGE UP (ref 5–8)
PLATELET # BLD AUTO: 382 K/UL — SIGNIFICANT CHANGE UP (ref 150–400)
POTASSIUM SERPL-MCNC: 3.8 MMOL/L — SIGNIFICANT CHANGE UP (ref 3.5–5.3)
POTASSIUM SERPL-SCNC: 3.8 MMOL/L — SIGNIFICANT CHANGE UP (ref 3.5–5.3)
PROT SERPL-MCNC: 7.2 G/DL — SIGNIFICANT CHANGE UP (ref 6.6–8.7)
PROT UR-MCNC: NEGATIVE — SIGNIFICANT CHANGE UP
RBC # BLD: 4.33 M/UL — SIGNIFICANT CHANGE UP (ref 4.2–5.8)
RBC # FLD: 13.3 % — SIGNIFICANT CHANGE UP (ref 10.3–14.5)
SALICYLATES SERPL-MCNC: <0.6 MG/DL — LOW (ref 10–20)
SODIUM SERPL-SCNC: 144 MMOL/L — SIGNIFICANT CHANGE UP (ref 135–145)
SP GR SPEC: 1.01 — SIGNIFICANT CHANGE UP (ref 1.01–1.02)
THC UR QL: POSITIVE
UROBILINOGEN FLD QL: NEGATIVE MG/DL — SIGNIFICANT CHANGE UP
WBC # BLD: 8.13 K/UL — SIGNIFICANT CHANGE UP (ref 3.8–10.5)
WBC # FLD AUTO: 8.13 K/UL — SIGNIFICANT CHANGE UP (ref 3.8–10.5)

## 2023-07-09 PROCEDURE — 99283 EMERGENCY DEPT VISIT LOW MDM: CPT | Mod: 25

## 2023-07-09 PROCEDURE — 85027 COMPLETE CBC AUTOMATED: CPT

## 2023-07-09 PROCEDURE — 80053 COMPREHEN METABOLIC PANEL: CPT

## 2023-07-09 PROCEDURE — 36415 COLL VENOUS BLD VENIPUNCTURE: CPT

## 2023-07-09 PROCEDURE — 80307 DRUG TEST PRSMV CHEM ANLYZR: CPT

## 2023-07-09 PROCEDURE — 81003 URINALYSIS AUTO W/O SCOPE: CPT

## 2023-07-09 PROCEDURE — 93010 ELECTROCARDIOGRAM REPORT: CPT

## 2023-07-09 PROCEDURE — 93005 ELECTROCARDIOGRAM TRACING: CPT

## 2023-07-09 PROCEDURE — 99285 EMERGENCY DEPT VISIT HI MDM: CPT

## 2023-07-09 RX ORDER — SODIUM CHLORIDE 9 MG/ML
1000 INJECTION INTRAMUSCULAR; INTRAVENOUS; SUBCUTANEOUS ONCE
Refills: 0 | Status: COMPLETED | OUTPATIENT
Start: 2023-07-09 | End: 2023-07-09

## 2023-07-09 RX ADMIN — SODIUM CHLORIDE 1000 MILLILITER(S): 9 INJECTION INTRAMUSCULAR; INTRAVENOUS; SUBCUTANEOUS at 10:32

## 2023-07-09 RX ADMIN — SODIUM CHLORIDE 1000 MILLILITER(S): 9 INJECTION INTRAMUSCULAR; INTRAVENOUS; SUBCUTANEOUS at 10:26

## 2023-07-09 NOTE — ED PROVIDER NOTE - OBJECTIVE STATEMENT
26 y/o male BIBEMS after being found down and minimally responsive outside of [facility]. Pt admits to recent cocaine, alcohol, meth, and viagra usage. Pt slow to respond but answering questions.

## 2023-07-09 NOTE — ED PROVIDER NOTE - ATTENDING CONTRIBUTION TO CARE
Dr. Gentile, Attending Physician-  I performed a face to face bedside interview with patient regarding history of present illness, review of symptoms and past medical history. I completed an independent physical exam.  I have discussed patient's plan of care with the resident.    26 y/o male BIBEMS after being found down and minimally responsive. Pt admits to recent cocaine, alcohol, meth, and viagra usage. Pt slow to respond but answering questions.  pe awake alert heent ncat neck supple cor s1s2 lung clear abd soft nontender neuro nonfocal dx substance abuse

## 2023-07-09 NOTE — ED PROVIDER NOTE - PATIENT PORTAL LINK FT
You can access the FollowMyHealth Patient Portal offered by [facility] by registering at the following website: [email or web address]. By joining [employer]’s FollowMyHealth portal, you will also be able to view your health information using other applications (apps) compatible with our system.

## 2023-07-09 NOTE — ED PROVIDER NOTE - PROGRESS NOTE DETAILS
Radu: Pt reassessed, clinically sober demanding his discharge papers. Medically cleared for discharge. Pt was offered SW to call [facility] and facilitate an admission but declined staying in the hospital. After discharge he returned to the ER as he spoke with [facility] stating he would need medical clearance. After returning he did not want to wait for an MD to clear and he left again. He returned one more time, spoke with MD Gentile who stated SW would be consulted to initiate clearance and transfer, but left prior to SW seeing the patient. Radu: Pt reassessed, clinically sober demanding his discharge papers. A&Ox3 alert with appropriate affect, answering questions clearly. Medically cleared for discharge. Pt was offered SW to call [facility] and facilitate an admission but declined staying in the hospital. After discharge he returned to the ER as he spoke with [facility] stating he would need medical clearance. After returning he did not want to wait for an MD to clear and he left again. He returned one more time, spoke with MD Gentile who stated SW would be consulted to initiate clearance and transfer, but left prior to SW seeing the patient.
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
## 2023-07-09 NOTE — ED ADULT NURSE NOTE - NSFALLRISKINTERV_ED_ALL_ED
Assistance OOB with selected safe patient handling equipment if applicable/Assistance with ambulation/Communicate fall risk and risk factors to all staff, patient, and family/Monitor gait and stability/Monitor for mental status changes and reorient to person, place, and time, as needed/Provide visual cue: yellow wristband, yellow gown, etc/Reinforce activity limits and safety measures with patient and family/Toileting schedule using arm’s reach rule for commode and bathroom/Use of alarms - bed, stretcher, chair and/or video monitoring/Call bell, personal items and telephone in reach/Instruct patient to call for assistance before getting out of bed/chair/stretcher/Non-slip footwear applied when patient is off stretcher/[place] to call system/Physically safe environment - no spills, clutter or unnecessary equipment/Purposeful Proactive Rounding/Room/bathroom lighting operational, light cord in reach

## 2023-07-09 NOTE — ED PROVIDER NOTE - CLINICAL SUMMARY MEDICAL DECISION MAKING FREE TEXT BOX
26 y/o male w/polysubstance abuse BIBEMS found altered outside of [facility]. EKG unremarkable. Reassess when clinically sober.
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
## 2023-07-09 NOTE — ED PROVIDER NOTE - PHYSICAL EXAMINATION
General: Occasionally difficult to arouse, NAD  Head: NC, AT  EENT: PERRLA, EOMI, no scleral icterus  Cardiac: RRR, no apparent murmurs, no lower extremity edema  Respiratory: CTABL, no respiratory distress on RA  Abdomen: soft, ND, NT, nonperitonitic  MSK/Vascular: full ROM, soft compartments, warm extremities, 2+ peripheral pulses b/l  Neuro: AAOx2, confused   Psych: quiet occasionally incoherent speech

## 2023-08-07 NOTE — ED ADULT NURSE NOTE - CAS ELECT INFOMATION PROVIDED
Physical Medicine and Rehabilitation Progress Note    Subjective     Samy Glass is a 89 y.o. male who was admitted for Acute on chronic systolic (congestive) heart failure (CMS/HCC) [I50.23].      Pt is feeling much better. Appears that moving hip better but right knee uncomfortable. Continues with effusion and mild pain with rom.  Feels much better with all mobility    Pt states that one of his sons and his aid are always present when he mobilizes. The two of them work together to help him transfer and move.   He states one of his sons are with him daily for most of the day.  Required min to mod of 2 for the transfer.     Yeaterday:   BP still dropped from 92/55 88 sitting   65/47 102 standing  93.61  99  Siting     Past records and images reviewed.    Medical History: No past medical history on file.    Surgical History: No past surgical history on file.    Social History:   Social History   Patient lives home with an aide 24/7.  Ramp to enter the home.  Uses a walker has a hospital bed wheelchair and frequently needs assistance of 1.Patient has been to [facility] rehab in the past    Social History Narrative   • Not on file       Lives with:  Aid  Prior Function Level: Prior Level of Function  Ambulation: assistive person  Transferring: assistive person  Toileting: assistive person  Bathing: assistive person  Dressing: assistive person  Eating: independent  IADLs: assistive person  Driving/Transportation: friends/family  Communication: understands/communicates without difficulty  Prior Level of Function Comment: MinAx1 at baseline, ambulates with RW household distances. mostly sits in high back chair or w/cRequires assistance for majority of mobility was able to walk with a walker      Family History: No family history on file.    History also provided by: Patient and chart      Allergies: Patient has no known allergies.    • clopidogreL  75 mg oral Daily   • escitalopram  5 mg oral Daily   •  levothyroxine  137 mcg oral Daily (6:30a)   • [Provider Managed Hold] metoprolol succinate XL  25 mg oral Daily   • [Provider Managed Hold] sacubitriL-valsartan  2 tablet oral BID   • [Provider Managed Hold] torsemide  20 mg oral BID       Review of Systems   All 11 systems were reviewed and negative except as noted in the HPI.      Objective   Labs  Reviewed the below labs.  WBC is 5.5 and H/H 10.4/30.6 creatinine 1.3 hemoglobin A1c is 6.4  Lab Results   Component Value Date    WBC 5.50 08/07/2023    HGB 10.4 (L) 08/07/2023    HCT 30.6 (L) 08/07/2023     (L) 08/07/2023    CHOL 105 08/04/2023    TRIG 89 08/04/2023    HDL 27 (L) 08/04/2023    ALT 10 08/04/2023    AST 35 08/04/2023     (L) 08/07/2023    K 3.9 08/07/2023     08/07/2023    CREATININE 1.3 08/07/2023    BUN 41 (H) 08/07/2023    CO2 22 08/07/2023    TSH 0.09 (L) 08/03/2023    INR 1.4 08/03/2023    HGBA1C 6.4 (H) 08/03/2023     Imaging  I reviewed the below imaging:  X-ray of the hip from 8/3/2023:  TECHNIQUE: Three view pelvis and right hip     BONES: Osteopenia.  Minimally displaced right inferior pubic ramus fracture.     JOINTS: No dislocation. Osteoarthritic changes of both hips with joint space  narrowing, marginal osteophyte formation and marginal sclerosis.  Lower lumbar  spondylitic changes.     SOFT TISSUES: Excreted contrast material in the urinary bladder.     --  IMPRESSION:  Osteopenia.  Minimally displaced right inferior pubic ramus fracture.    ---------------------------------------  X-ray of the right shoulder and elbow from 8/3/2023:  IMPRESSION:  No evidence of a right shoulder or humerus fracture. Possible rotator cuff  calcific tendinosis.  Marked limited evaluation of the right elbow. No evidence of a displaced elbow  fracture. Lateral elbow examination can be considered for improved evaluation.    --------------------------------------------        Physical Exam  Visit Vitals  /63 (BP Location: Left upper  "arm, Patient Position: Lying)   Pulse 89   Temp 36.5 °C (97.7 °F) (Oral)   Resp 16   Ht 1.854 m (6' 1\")   Wt 77.2 kg (170 lb 3.1 oz)   SpO2 92%   BMI 22.45 kg/m²     Examination  Patient is pleasant awake alert  Ecchymosis on the left forehead and frontal region of scalp and into the left eye orbital  HEENT: No neck pain  Lungs: Breathing unlabored.  No rales or rhonchi.  Chest wall: No palpable pain  Heart: Irregular today  Abdomen: Bowel sounds: Soft nontender ostomy functioning  Skin: No rash.  Extremities:   Right upper extremity: Patient is actually able to lift the right arm and denies any discomfort  Right lower extremity patient is able to hip flex abduct adduct and denies any discomfort    He was able to push with force through that right lower extremity and had no pain a little bit of discomfort in his knee  Right knee: Patient with gross degenerative deformity and a positive effusion no erythema or warmth minimal joint line pain little bit of discomfort with range of motion  Neurological:  Alert and oriented with intact speech and cognition.  Sensation: subjectively preserved to touch.  Motor testing shows fairly good strength in both upper and lower extremities.  Is guarding around the right shoulder but able to give resisitance and also both hip flexors are slightly weak 3/5      Assessment   89 y.o. male being consulted for rehabilitation needs.  Plan of care was discussed with patient and Aid in team     1.  Mobility and self-care deficits: Patient still remains orthostatic however he is feeling much improved.  He began to work with therapy.  We continue to monitor blood pressure as we mobilize.  He had difficulty shifting weight onto the right leg and felt weak around the knee.  I do feel that he would benefit from a lidocaine patch around his degenerative joint of his knee.  His hip he was able to provide pressure today on exam without any discomfort.  We will continue with physical and Occupational " Therapy while here and monitor vitals.  May benefit from UTE stockings to help with blood pressure.    According to the patient his son and aide have been assisting him with all mobilization.  Once medically stable I would like him to return home with home health services and persistent assistance from his son and aide as long as they are able to provide.    2.  Hypotension: Patient's blood pressure is dropping.  To be seen by cardiology with their recommendations.  Will monitor vitals as we start to mobilize    3.  Right RTC strain: Patient seen by orthopedics weight-bear as tolerated and actually moving the shoulder pretty well.     4.  Right inferior pubic ramus fracture: Patient has minimal pain with ranging of the right leg.  We will see how he does once he is up and mobilizing.  He is weight-bear as tolerated at present.  Should use a walker at all times.  Appears as though most of his pain is actually through the knee.  He does have an effusion but no erythema or warmth.  We should try lidocaine patch on his knee.    5.  CHF with history of coronary artery disease with CABG, chronic A-fib and hypotension: Patient being seen by cardiology.  He is presently undergoing diuresis for CHF.  Presently diuresis is being held secondary to hypotension.  Continue per their recommendations.  No new Assessment & Plan notes have been filed under this hospital service since the last note was generated.  Service: Physical Medicine and Rehabilitation            Shahnaz Peña MD  8/7/2023  9:50 AM     DC instructions

## 2023-08-19 ENCOUNTER — EMERGENCY (EMERGENCY)
Facility: HOSPITAL | Age: 28
LOS: 1 days | Discharge: HOME | End: 2023-08-19
Attending: STUDENT IN AN ORGANIZED HEALTH CARE EDUCATION/TRAINING PROGRAM
Payer: COMMERCIAL

## 2023-08-19 VITALS
HEART RATE: 78 BPM | HEIGHT: 73 IN | OXYGEN SATURATION: 98 % | RESPIRATION RATE: 18 BRPM | SYSTOLIC BLOOD PRESSURE: 110 MMHG | TEMPERATURE: 100 F | DIASTOLIC BLOOD PRESSURE: 69 MMHG

## 2023-08-19 PROCEDURE — 99284 EMERGENCY DEPT VISIT MOD MDM: CPT | Mod: 25

## 2023-08-19 NOTE — ED ADULT TRIAGE NOTE - CHIEF COMPLAINT QUOTE
BIBA from [place] [facility] for sore throat x 2 days and weakness, body aches, chills, fever x2 days, tested negative for COVID at [place]. Took Motrin around 2030. BIBA from [place] [facility] for sore throat x 2 days and weakness, body aches, chills, fever x1 day, tested negative for COVID at [place]. Took Motrin around 2030.

## 2023-08-19 NOTE — ED ADULT TRIAGE NOTE - ARRIVAL FROM
Anesthesia Evaluation     Patient summary reviewed and Nursing notes reviewed   no history of anesthetic complications:  NPO Solid Status: > 8 hours  NPO Liquid Status: > 8 hours           Airway   Mallampati: II  TM distance: >3 FB  Neck ROM: full  No difficulty expected  Dental      Pulmonary    (-) sleep apnea, not a smoker  Cardiovascular   Exercise tolerance: poor (<4 METS)    PT is on anticoagulation therapy    (+) pacemaker ICD, CAD, cardiac stents within the past 12 months CHF , hyperlipidemia,     ROS comment: Pt receives medical care at [facility] and I am unable to view records.   He reports multiple stents, most recent 1/1/20  Recent hospitalization for CHF two weeks ago at [facility], he was given diuretics    Neuro/Psych  GI/Hepatic/Renal/Endo    (+) obesity,   renal disease stones and CRI, thyroid problem hypothyroidism    Musculoskeletal     Abdominal    Substance History      OB/GYN          Other   arthritis,                      Anesthesia Plan    ASA 3     general   (Device interrogated in pre-op, medtronic icd. Pt is 100% v-sensed. VVI. Will place magnet and interrogate device post op if any cautery is used. )  intravenous induction     Anesthetic plan, all risks, benefits, and alternatives have been provided, discussed and informed consent has been obtained with: patient.       [place]

## 2023-08-20 LAB
RAPID RVP RESULT: DETECTED
SARS-COV-2 RNA SPEC QL NAA+PROBE: DETECTED

## 2023-08-20 PROCEDURE — 0225U NFCT DS DNA&RNA 21 SARSCOV2: CPT

## 2023-08-20 PROCEDURE — 99283 EMERGENCY DEPT VISIT LOW MDM: CPT

## 2023-08-20 RX ORDER — ACETAMINOPHEN 500 MG
650 TABLET ORAL ONCE
Refills: 0 | Status: COMPLETED | OUTPATIENT
Start: 2023-08-20 | End: 2023-08-20

## 2023-08-20 RX ORDER — DEXAMETHASONE 0.5 MG/5ML
10 ELIXIR ORAL ONCE
Refills: 0 | Status: COMPLETED | OUTPATIENT
Start: 2023-08-20 | End: 2023-08-20

## 2023-08-20 RX ADMIN — Medication 650 MILLIGRAM(S): at 01:45

## 2023-08-20 RX ADMIN — Medication 10 MILLIGRAM(S): at 01:45

## 2023-08-20 RX ADMIN — Medication 650 MILLIGRAM(S): at 04:04

## 2023-08-20 NOTE — ED PROVIDER NOTE - CLINICAL SUMMARY MEDICAL DECISION MAKING FREE TEXT BOX
26yo male with pmh of alcohol abuse now in rehab in [place] presents with cough, sore throat, body aches - pt VSS, well appearing, symptoms improved pt COVID+, staff at [place] informed states pt can return will set up transportation

## 2023-08-20 NOTE — ED PROVIDER NOTE - NSFOLLOWUPINSTRUCTIONS_ED_ALL_ED_FT
- Follow up with your doctor within 2-3 days.   - Take Tylenol (Acetaminophen) 650mg or Motrin (Ibuprofen/Advil) 600mg every 6 hours as needed for pain.   - Stay well hydrated.   - See attached COVID-19 instructions.      Avoid contact with others. Wash your hands frequently with soap and warm water for at least 20 seconds. If you develop worsening symptoms- such as respiratory distress, confusion, severe weakness, or anything new or concerning, please return to the emergency department to be evaluated.

## 2023-08-20 NOTE — ED PROVIDER NOTE - PATIENT PORTAL LINK FT
You can access the FollowMyHealth Patient Portal offered by [facility] by registering at the following website: [email or web address]. By joining [employer]’s FollowMyHealth portal, you will also be able to view your health information using other applications (apps) compatible with our system.

## 2023-08-20 NOTE — ED ADULT NURSE NOTE - CHIEF COMPLAINT QUOTE
BIBA from [place] building 1 for sore throat x 2 days and weakness, body aches, chills, fever x1 day, tested negative for COVID at [place]. Took Motrin around 2030.

## 2023-08-20 NOTE — ED PROVIDER NOTE - OBJECTIVE STATEMENT
28yo male with pmh of alcohol abuse now in rehab in [place] presents with cough, sore throat, body aches. As per staff at [place] pt COVID+ there. Pt denies ha, loc, focal neuro deficits, cp/sob/palp, abd pain/n/v/d, urinary symptoms, recent travel

## 2023-09-26 ENCOUNTER — EMERGENCY (EMERGENCY)
Facility: HOSPITAL | Age: 28
LOS: 1 days | Discharge: HOME | End: 2023-09-26
Attending: EMERGENCY MEDICINE
Payer: COMMERCIAL

## 2023-09-26 VITALS
SYSTOLIC BLOOD PRESSURE: 130 MMHG | HEIGHT: 73 IN | OXYGEN SATURATION: 98 % | RESPIRATION RATE: 20 BRPM | WEIGHT: 179.9 LBS | HEART RATE: 78 BPM | DIASTOLIC BLOOD PRESSURE: 74 MMHG | TEMPERATURE: 98 F

## 2023-09-26 PROCEDURE — 99291 CRITICAL CARE FIRST HOUR: CPT

## 2023-09-26 RX ORDER — HALOPERIDOL DECANOATE 100 MG/ML
5 INJECTION INTRAMUSCULAR ONCE
Refills: 0 | Status: COMPLETED | OUTPATIENT
Start: 2023-09-26 | End: 2023-09-26

## 2023-09-26 RX ORDER — MIDAZOLAM HYDROCHLORIDE 1 MG/ML
5 INJECTION, SOLUTION INTRAMUSCULAR; INTRAVENOUS ONCE
Refills: 0 | Status: DISCONTINUED | OUTPATIENT
Start: 2023-09-26 | End: 2023-09-26

## 2023-09-26 RX ADMIN — HALOPERIDOL DECANOATE 5 MILLIGRAM(S): 100 INJECTION INTRAMUSCULAR at 16:40

## 2023-09-26 RX ADMIN — MIDAZOLAM HYDROCHLORIDE 5 MILLIGRAM(S): 1 INJECTION, SOLUTION INTRAMUSCULAR; INTRAVENOUS at 16:40

## 2023-09-26 NOTE — ED PROVIDER NOTE - ATTENDING CONTRIBUTION TO CARE
I, Bailey Flores, have personally seen and examined this patient. I have fully participated in the care of this patient. I have reviewed all pertinent clinical information, including history, physical exam, plan and the Resident's note and agree except as noted below.     patient critically ill requiring medications with intensive monitoring, discussion with consultants, discussion with patient and family, interpretation of diagnostic studies.     27-year-old male who admits to drinking alcohol.  States he was brought in for [facility].  Denies any SI HI or hallucinations.  Patient is oriented to self unsure where he currently is except he does know he is in a hospital.  Unsure of the date.  Patient is not cooperative does not display logical thinking at this time due to intoxication.  Patient threatening to leave patient does not have capacity at this time patient given chemical sedation for safety. will observe for sobriety      patient signed out to incoming physician pending results of testing and reassessment.  All decisions regarding the progression of care will be made at their discretion.

## 2023-09-26 NOTE — ED PROVIDER NOTE - PATIENT PORTAL LINK FT
You can access the FollowMyHealth Patient Portal offered by [facility] by registering at the following website: [email or web address]. By joining [employer]’s FollowMyHealth portal, you will also be able to view your health information using other applications (apps) compatible with our system.

## 2023-09-26 NOTE — ED ADULT NURSE NOTE - OBJECTIVE STATEMENT
received pt s/p medications for aggressive behavior, pt is sleeping in stretcher, respirations are even and unlabored on room air, unable obtain history at this time. pt safety maintained.

## 2023-09-26 NOTE — ED PROVIDER NOTE - OBJECTIVE STATEMENT
28 y/o M pt BIB EMS for agitation. Pt is tangential, incoherent. Pt markedly agitated, is a danger to self and staff, requires chemical sedation for safety of self and staff

## 2023-09-26 NOTE — ED PROVIDER NOTE - PROGRESS NOTE DETAILS
Brian ZAMARRIPA: Patient now calm cooperative and sober.  Stating he is not admitted to [facility] but was there to get admitted.  Needs blood work in order to get admitted.  Labs ordered.  Will discharge patient with results when labs are back.

## 2023-09-26 NOTE — ED PROVIDER NOTE - PHYSICAL EXAMINATION
Gen: Well appearing  Head: NC/AT  Neck: trachea midline  Card: regular rate and rhythm  Resp:  CTAB  Abd: soft, non-distended, non-tender  Ext: no deformities above reported baseline  Neuro:  A&O, no motor or sensory deficits above reported baseline  Skin:  Warm and dry as visualized  Psych:  labile, agitated, tangential
home

## 2023-09-26 NOTE — ED ADULT NURSE NOTE - NSFALLRISKINTERV_ED_ALL_ED
Assistance OOB with selected safe patient handling equipment if applicable/Assistance with ambulation/Communicate fall risk and risk factors to all staff, patient, and family/Monitor gait and stability/Provide visual cue: yellow wristband, yellow gown, etc/Reinforce activity limits and safety measures with patient and family/Call bell, personal items and telephone in reach/Instruct patient to call for assistance before getting out of bed/chair/stretcher/Non-slip footwear applied when patient is off stretcher/[place] to call system/Physically safe environment - no spills, clutter or unnecessary equipment/Purposeful Proactive Rounding/Room/bathroom lighting operational, light cord in reach

## 2023-09-27 VITALS
HEART RATE: 73 BPM | SYSTOLIC BLOOD PRESSURE: 119 MMHG | DIASTOLIC BLOOD PRESSURE: 70 MMHG | TEMPERATURE: 98 F | RESPIRATION RATE: 18 BRPM | OXYGEN SATURATION: 96 %

## 2023-09-27 LAB
ALBUMIN SERPL ELPH-MCNC: 5 G/DL — SIGNIFICANT CHANGE UP (ref 3.3–5.2)
ALP SERPL-CCNC: 60 U/L — SIGNIFICANT CHANGE UP (ref 40–120)
ALT FLD-CCNC: 23 U/L — SIGNIFICANT CHANGE UP
AMPHET UR-MCNC: NEGATIVE — SIGNIFICANT CHANGE UP
ANION GAP SERPL CALC-SCNC: 15 MMOL/L — SIGNIFICANT CHANGE UP (ref 5–17)
AST SERPL-CCNC: 34 U/L — SIGNIFICANT CHANGE UP
BARBITURATES UR SCN-MCNC: NEGATIVE — SIGNIFICANT CHANGE UP
BENZODIAZ UR-MCNC: NEGATIVE — SIGNIFICANT CHANGE UP
BILIRUB SERPL-MCNC: 0.2 MG/DL — LOW (ref 0.4–2)
BUN SERPL-MCNC: 10.2 MG/DL — SIGNIFICANT CHANGE UP (ref 8–20)
CALCIUM SERPL-MCNC: 9.1 MG/DL — SIGNIFICANT CHANGE UP (ref 8.4–10.5)
CHLORIDE SERPL-SCNC: 102 MMOL/L — SIGNIFICANT CHANGE UP (ref 96–108)
CO2 SERPL-SCNC: 25 MMOL/L — SIGNIFICANT CHANGE UP (ref 22–29)
COCAINE METAB.OTHER UR-MCNC: NEGATIVE — SIGNIFICANT CHANGE UP
CREAT SERPL-MCNC: 0.74 MG/DL — SIGNIFICANT CHANGE UP (ref 0.5–1.3)
EGFR: 127 ML/MIN/1.73M2 — SIGNIFICANT CHANGE UP
ETHANOL SERPL-MCNC: 142 MG/DL — HIGH (ref 0–9)
GLUCOSE SERPL-MCNC: 84 MG/DL — SIGNIFICANT CHANGE UP (ref 70–99)
HCT VFR BLD CALC: 44.9 % — SIGNIFICANT CHANGE UP (ref 39–50)
HGB BLD-MCNC: 15.2 G/DL — SIGNIFICANT CHANGE UP (ref 13–17)
MCHC RBC-ENTMCNC: 30.6 PG — SIGNIFICANT CHANGE UP (ref 27–34)
MCHC RBC-ENTMCNC: 33.9 GM/DL — SIGNIFICANT CHANGE UP (ref 32–36)
MCV RBC AUTO: 90.3 FL — SIGNIFICANT CHANGE UP (ref 80–100)
METHADONE UR-MCNC: NEGATIVE — SIGNIFICANT CHANGE UP
OPIATES UR-MCNC: NEGATIVE — SIGNIFICANT CHANGE UP
PCP SPEC-MCNC: SIGNIFICANT CHANGE UP
PCP UR-MCNC: NEGATIVE — SIGNIFICANT CHANGE UP
PLATELET # BLD AUTO: 429 K/UL — HIGH (ref 150–400)
POTASSIUM SERPL-MCNC: 4 MMOL/L — SIGNIFICANT CHANGE UP (ref 3.5–5.3)
POTASSIUM SERPL-SCNC: 4 MMOL/L — SIGNIFICANT CHANGE UP (ref 3.5–5.3)
PROT SERPL-MCNC: 7.6 G/DL — SIGNIFICANT CHANGE UP (ref 6.6–8.7)
RBC # BLD: 4.97 M/UL — SIGNIFICANT CHANGE UP (ref 4.2–5.8)
RBC # FLD: 13.1 % — SIGNIFICANT CHANGE UP (ref 10.3–14.5)
SODIUM SERPL-SCNC: 142 MMOL/L — SIGNIFICANT CHANGE UP (ref 135–145)
THC UR QL: POSITIVE
WBC # BLD: 10.87 K/UL — HIGH (ref 3.8–10.5)
WBC # FLD AUTO: 10.87 K/UL — HIGH (ref 3.8–10.5)

## 2023-09-27 PROCEDURE — 85027 COMPLETE CBC AUTOMATED: CPT

## 2023-09-27 PROCEDURE — 96372 THER/PROPH/DIAG INJ SC/IM: CPT

## 2023-09-27 PROCEDURE — 82962 GLUCOSE BLOOD TEST: CPT

## 2023-09-27 PROCEDURE — 99285 EMERGENCY DEPT VISIT HI MDM: CPT

## 2023-09-27 PROCEDURE — 80053 COMPREHEN METABOLIC PANEL: CPT

## 2023-09-27 PROCEDURE — 36415 COLL VENOUS BLD VENIPUNCTURE: CPT

## 2023-09-27 PROCEDURE — 80307 DRUG TEST PRSMV CHEM ANLYZR: CPT

## 2023-09-27 NOTE — ED ADULT NURSE REASSESSMENT NOTE - NS ED NURSE REASSESS COMMENT FT1
Pt is resting in bed comfortably at this time, respirations even and unlabored on room air, no apparent distress noted at this time. pt safety maintained.

## 2023-09-28 DIAGNOSIS — F10.129 ALCOHOL ABUSE WITH INTOXICATION, UNSPECIFIED: ICD-10-CM

## 2023-09-28 DIAGNOSIS — R45.1 RESTLESSNESS AND AGITATION: ICD-10-CM

## 2023-10-16 ENCOUNTER — EMERGENCY (EMERGENCY)
Facility: HOSPITAL | Age: 28
LOS: 1 days | Discharge: HOME | End: 2023-10-16
Attending: EMERGENCY MEDICINE
Payer: COMMERCIAL

## 2023-10-16 VITALS
HEART RATE: 96 BPM | TEMPERATURE: 98 F | DIASTOLIC BLOOD PRESSURE: 76 MMHG | RESPIRATION RATE: 20 BRPM | OXYGEN SATURATION: 98 % | SYSTOLIC BLOOD PRESSURE: 124 MMHG

## 2023-10-16 VITALS
OXYGEN SATURATION: 95 % | HEIGHT: 73 IN | RESPIRATION RATE: 18 BRPM | TEMPERATURE: 98 F | DIASTOLIC BLOOD PRESSURE: 79 MMHG | SYSTOLIC BLOOD PRESSURE: 127 MMHG | HEART RATE: 82 BPM | WEIGHT: 184.97 LBS

## 2023-10-16 LAB
ALBUMIN SERPL ELPH-MCNC: 4.6 G/DL — SIGNIFICANT CHANGE UP (ref 3.3–5.2)
ALP SERPL-CCNC: 85 U/L — SIGNIFICANT CHANGE UP (ref 40–120)
ALT FLD-CCNC: 24 U/L — SIGNIFICANT CHANGE UP
ANION GAP SERPL CALC-SCNC: 16 MMOL/L — SIGNIFICANT CHANGE UP (ref 5–17)
AST SERPL-CCNC: 24 U/L — SIGNIFICANT CHANGE UP
BASOPHILS # BLD AUTO: 0.08 K/UL — SIGNIFICANT CHANGE UP (ref 0–0.2)
BASOPHILS NFR BLD AUTO: 0.7 % — SIGNIFICANT CHANGE UP (ref 0–2)
BILIRUB SERPL-MCNC: <0.2 MG/DL — LOW (ref 0.4–2)
BUN SERPL-MCNC: 11.2 MG/DL — SIGNIFICANT CHANGE UP (ref 8–20)
CALCIUM SERPL-MCNC: 9 MG/DL — SIGNIFICANT CHANGE UP (ref 8.4–10.5)
CHLORIDE SERPL-SCNC: 105 MMOL/L — SIGNIFICANT CHANGE UP (ref 96–108)
CO2 SERPL-SCNC: 22 MMOL/L — SIGNIFICANT CHANGE UP (ref 22–29)
CREAT SERPL-MCNC: 0.88 MG/DL — SIGNIFICANT CHANGE UP (ref 0.5–1.3)
EGFR: 120 ML/MIN/1.73M2 — SIGNIFICANT CHANGE UP
EOSINOPHIL # BLD AUTO: 0.13 K/UL — SIGNIFICANT CHANGE UP (ref 0–0.5)
EOSINOPHIL NFR BLD AUTO: 1.1 % — SIGNIFICANT CHANGE UP (ref 0–6)
ETHANOL SERPL-MCNC: 350 MG/DL — HIGH (ref 0–9)
GLUCOSE SERPL-MCNC: 107 MG/DL — HIGH (ref 70–99)
HCT VFR BLD CALC: 42.8 % — SIGNIFICANT CHANGE UP (ref 39–50)
HGB BLD-MCNC: 14.6 G/DL — SIGNIFICANT CHANGE UP (ref 13–17)
IMM GRANULOCYTES NFR BLD AUTO: 0.4 % — SIGNIFICANT CHANGE UP (ref 0–0.9)
LYMPHOCYTES # BLD AUTO: 19.9 % — SIGNIFICANT CHANGE UP (ref 13–44)
LYMPHOCYTES # BLD AUTO: 2.36 K/UL — SIGNIFICANT CHANGE UP (ref 1–3.3)
MCHC RBC-ENTMCNC: 30.7 PG — SIGNIFICANT CHANGE UP (ref 27–34)
MCHC RBC-ENTMCNC: 34.1 GM/DL — SIGNIFICANT CHANGE UP (ref 32–36)
MCV RBC AUTO: 90.1 FL — SIGNIFICANT CHANGE UP (ref 80–100)
MONOCYTES # BLD AUTO: 0.81 K/UL — SIGNIFICANT CHANGE UP (ref 0–0.9)
MONOCYTES NFR BLD AUTO: 6.8 % — SIGNIFICANT CHANGE UP (ref 2–14)
NEUTROPHILS # BLD AUTO: 8.43 K/UL — HIGH (ref 1.8–7.4)
NEUTROPHILS NFR BLD AUTO: 71.1 % — SIGNIFICANT CHANGE UP (ref 43–77)
PLATELET # BLD AUTO: 400 K/UL — SIGNIFICANT CHANGE UP (ref 150–400)
POTASSIUM SERPL-MCNC: 3.9 MMOL/L — SIGNIFICANT CHANGE UP (ref 3.5–5.3)
POTASSIUM SERPL-SCNC: 3.9 MMOL/L — SIGNIFICANT CHANGE UP (ref 3.5–5.3)
PROT SERPL-MCNC: 7.7 G/DL — SIGNIFICANT CHANGE UP (ref 6.6–8.7)
RBC # BLD: 4.75 M/UL — SIGNIFICANT CHANGE UP (ref 4.2–5.8)
RBC # FLD: 13.3 % — SIGNIFICANT CHANGE UP (ref 10.3–14.5)
SODIUM SERPL-SCNC: 143 MMOL/L — SIGNIFICANT CHANGE UP (ref 135–145)
WBC # BLD: 11.86 K/UL — HIGH (ref 3.8–10.5)
WBC # FLD AUTO: 11.86 K/UL — HIGH (ref 3.8–10.5)

## 2023-10-16 PROCEDURE — 85025 COMPLETE CBC W/AUTO DIFF WBC: CPT

## 2023-10-16 PROCEDURE — 99283 EMERGENCY DEPT VISIT LOW MDM: CPT

## 2023-10-16 PROCEDURE — 80053 COMPREHEN METABOLIC PANEL: CPT

## 2023-10-16 PROCEDURE — 99285 EMERGENCY DEPT VISIT HI MDM: CPT

## 2023-10-16 PROCEDURE — 80307 DRUG TEST PRSMV CHEM ANLYZR: CPT

## 2023-10-16 PROCEDURE — 36415 COLL VENOUS BLD VENIPUNCTURE: CPT

## 2023-10-16 NOTE — ED PROVIDER NOTE - OBJECTIVE STATEMENT
27y Male 27y Male BIBEMS from [facility] after endorsing to alcohol ingestion and using fentanyl. Patient was noted to be somnolent and administered 8mg Narcan at [facility]. Patient spontaneously breathing on arrival, responds to verbal and painful stimuli. No signs of external traumatic injuries.

## 2023-10-16 NOTE — ED PROVIDER NOTE - CLINICAL SUMMARY MEDICAL DECISION MAKING FREE TEXT BOX
27y Male intoxicated appearing after endorsing to alcohol ingestion and fentanyl use. Spontaneously breathing on arrival, responds to verbal and painful stimuli. No signs of external traumatic injuries. Differential diagnosis includes but not limited to alcohol intoxication, drug use, substance ingestion, electrolyte derangement. 27y Male intoxicated appearing after endorsing to alcohol ingestion and fentanyl use. Spontaneously breathing on arrival, responds to verbal and painful stimuli. No signs of external traumatic injuries. Differential diagnosis includes but not limited to alcohol intoxication, drug use, substance ingestion, electrolyte derangement. Clinically sober on reassessment. Patient to follow up with pcp and return to [facility].

## 2023-10-16 NOTE — ED PROVIDER NOTE - ATTENDING CONTRIBUTION TO CARE
I, Roger Molina, performed a face to face bedside interview with this patient regarding history of present illness, review of symptoms and relevant past medical, social and family history.  I completed an independent physical examination. I have communicated the patient’s plan of care and disposition with the resident.  27 year old presents with AMS. pt admitted to using fentanyl and drinking today, arrive [facility], found to be lethargic, given narcan  Gen: NAD, well appearing  CV: RRR  Pul: CTA b/l  Abd: Soft, non-distended, non-tender  Neuro: no focal deficits  Pt is awake, alert, lucid and coherent. Ambulating with steady gait, no complaints, no sign of trauma. No clinical sign of intox or withdrawal. Stable for dc.

## 2023-10-16 NOTE — ED ADULT NURSE REASSESSMENT NOTE - NS ED NURSE REASSESS COMMENT FT1
Pt arrived with clothing only as per triage/charge RN. Call placed to [facility] at this time (spoke with Lindsay Henao) to inquire about cellphone and wallet. Stated they have his belongings and to call for pickup. Pt provided number.

## 2023-10-16 NOTE — ED PROVIDER NOTE - PHYSICAL EXAMINATION
Gen: Well appearing in NAD  Head: NC/AT  Neck: trachea midline  Resp:  No distress  Ext: no deformities  Neuro:  A&O appears non focal  Skin:  Warm and dry as visualized  Psych: Calm, cooperative Gen: intoxicated appearing in NAD  Head: NC/AT  Neck: trachea midline  Resp:  No distress  Ext: no deformities  Neuro:  A&O appears non focal  Skin:  Warm and dry as visualized  Psych: Calm, cooperative

## 2023-10-16 NOTE — ED PROVIDER NOTE - PATIENT PORTAL LINK FT
You can access the FollowMyHealth Patient Portal offered by [facility] by registering at the following website: [email or web address]. By joining [employer]’s FollowMyHealth portal, you will also be able to view your health information using other applications (apps) compatible with our system.

## 2023-10-16 NOTE — ED ADULT NURSE REASSESSMENT NOTE - NS ED NURSE REASSESS COMMENT FT1
Pt awake and angry with staff stating we "stole his belongings". MD at bedside and will be discharging pt. Pt to receive all his belongings and is awaiting DC paperwork.

## 2023-10-16 NOTE — ED PROVIDER NOTE - PROGRESS NOTE DETAILS
patient awake, ambulatory, requesting to leave. Patient agrees with plan for discharge at this time and states he will take himself to [facility] on his own. Return to ED precautions and discharge instructions discussed with patient with verbal understanding. -DO Kapil

## 2023-10-16 NOTE — ED ADULT TRIAGE NOTE - CHIEF COMPLAINT QUOTE
pt was picked up at [facility] for possibly intox and overdose. pt was passed out there and admitted to drinking and using fentyl,  narcan 8 mg intranasal given pt is responsive to tactile stimuli but not answering questions MD called to evaluate. Arrived with socks and sweatpants on No shirt

## 2023-10-16 NOTE — ED PROVIDER NOTE - NSFOLLOWUPINSTRUCTIONS_ED_ALL_ED_FT
Please follow-up with your primary care doctor.  Please call for an appointment in the next 48 hours but if you cannot follow-up with your primary care doctor please return to the Emergency Department for any urgent issues.    You were given a copy of the tests performed today.  Please bring the results with you and review them with your primary care doctor.    If you have any worsening of symptoms or any other concerns please return to the Emergency Department immediately.    Please continue taking your home medications as directed.      Alcohol Abuse    Alcohol intoxication occurs when the amount of alcohol that a person has consumed impairs his or her ability to mentally and physically function. Chronic alcohol consumption can also lead to a variety of health issues including neurological disease, stomach disease, heart disease, liver disease, etc. Do not drive after drinking alcohol. Drinking enough alcohol to end up in an Emergency Room suggests you may have an alcohol abuse problem. Seek help at a drug addiction center.    SEEK IMMEDIATE MEDICAL CARE IF YOU HAVE ANY OF THE FOLLOWING SYMPTOMS: seizures, vomiting blood, blood in your stool, lightheadedness/dizziness, or becoming shaky to tremulous when you stop drinking.

## 2023-11-07 NOTE — ED ADULT NURSE NOTE - INTEGUMENTARY WDL
impairments found/functional limitations in following categories/risk reduction/prevention/rehab potential/therapy frequency/predicted duration of therapy intervention/anticipated discharge recommendation
Color consistent with ethnicity/race, warm, dry intact, resilient.

## 2023-12-05 NOTE — ED PROCEDURE NOTE - NS ED PERI NEURO NEG
Pre-application: Motor, sensory, and vascular responses intact in the injured extremity./Post-application: Motor, sensory, and vascular responses intact in the injured extremity. How Severe Is Your Skin Lesion?: moderate Have Your Skin Lesions Been Treated?: not been treated Is This A New Presentation, Or A Follow-Up?: Skin Lesion

## 2023-12-27 NOTE — ED PROVIDER NOTE - NS ED MD DISPO DISCHARGE
From: Jose Luis Crabtree  To: Kusum Pang  Sent: 12/27/2023 2:55 AM CST  Subject: Test strips    About two weeks ago I call to get a refill of my test strips. For the second time I stoped in at [facility] today. Erna the pharmacy tech said that she still has not received permission to refill the script. She further stated that your office has been called at least six times and that Medicare has been contacted numerous times and the situation has not been resolved. Any suggestions?    Have a Very Swetha and a Happy, Healthy and Prosperous 2024!    Daryl   Home

## 2024-02-01 NOTE — ED ADULT TRIAGE NOTE - MODE OF ARRIVAL
[TextEntry] : MSK:  + Right knee effusion, no warmth, erythema. +TTP Right lower back trigger point.  [facility] Amb[facility]

## 2024-02-10 NOTE — ED PROVIDER NOTE - ALLERGIC/IMMUNOLOGIC NEGATIVE STATEMENT, MLM
Yes no dermatitis, no environmental allergies, no food allergies, no immunosuppressive disorder, and no pruritus.

## 2024-06-17 ENCOUNTER — EMERGENCY (EMERGENCY)
Facility: HOSPITAL | Age: 29
LOS: 0 days | Discharge: HOME | End: 2024-06-17
Attending: HOSPITALIST
Payer: MEDICAID

## 2024-06-17 VITALS
TEMPERATURE: 98 F | OXYGEN SATURATION: 97 % | HEART RATE: 122 BPM | SYSTOLIC BLOOD PRESSURE: 144 MMHG | DIASTOLIC BLOOD PRESSURE: 105 MMHG | RESPIRATION RATE: 18 BRPM

## 2024-06-17 VITALS
TEMPERATURE: 98 F | SYSTOLIC BLOOD PRESSURE: 118 MMHG | OXYGEN SATURATION: 97 % | DIASTOLIC BLOOD PRESSURE: 80 MMHG | RESPIRATION RATE: 18 BRPM | HEART RATE: 85 BPM

## 2024-06-17 DIAGNOSIS — R41.82 ALTERED MENTAL STATUS, UNSPECIFIED: ICD-10-CM

## 2024-06-17 DIAGNOSIS — F10.129 ALCOHOL ABUSE WITH INTOXICATION, UNSPECIFIED: ICD-10-CM

## 2024-06-17 DIAGNOSIS — R00.0 TACHYCARDIA, UNSPECIFIED: ICD-10-CM

## 2024-06-17 DIAGNOSIS — F17.200 NICOTINE DEPENDENCE, UNSPECIFIED, UNCOMPLICATED: ICD-10-CM

## 2024-06-17 PROCEDURE — 99283 EMERGENCY DEPT VISIT LOW MDM: CPT | Mod: 25

## 2024-06-17 PROCEDURE — 99285 EMERGENCY DEPT VISIT HI MDM: CPT | Mod: 25

## 2024-06-17 PROCEDURE — 93005 ELECTROCARDIOGRAM TRACING: CPT

## 2024-06-17 PROCEDURE — 93010 ELECTROCARDIOGRAM REPORT: CPT

## 2024-06-17 NOTE — ED ADULT TRIAGE NOTE - CHIEF COMPLAINT QUOTE
[place] [facility] [place] pt admits to drinking and smoking unknown "blunt". pt states afterwards his heart was racing. pt +aob; poor historian. STAT EKG

## 2024-06-17 NOTE — ED PROVIDER NOTE - PHYSICAL EXAMINATION
***GEN - intoxicated appearing; A+O x3 ***HEAD - NC/AT ***EYES/NOSE - PERRL, EOMI, mucous membranes moist, no discharge ***THROAT: Oral cavity and pharynx normal. No inflammation, swelling, exudate, or lesions.  ***NECK: Neck supple, non-tender   ***PULMONARY - CTA b/l, symmetric breath sounds. ***CARDIAC -s1s2, tachycardiac, no M,G,R  ***ABDOMEN - +BS, ND, NT, soft  ***BACK - no CVA tenderness, Normal  spine ***EXTREMITIES - symmetric pulses, 2+ dp, capillary refill < 2 seconds ***SKIN - no rash or bruising   ***NEUROLOGIC - alert, CN 2-12 intact, no focal deficits

## 2024-06-17 NOTE — ED ADULT NURSE NOTE - OBJECTIVE STATEMENT
Pt arrived to ED with c/o AMS. Pt states he is an alcoholic, but drank for the first time tonight in months. Pt states he drank alcohol and smoked an unknown drug. Pt poor historian. Pt states he is sleepy. Pt denies any pain or other complaints.

## 2024-06-17 NOTE — ED PROVIDER NOTE - PATIENT PORTAL LINK FT
You can access the FollowMyHealth Patient Portal offered by [facility] by registering at the following website: [email or web address]. By joining [employer]’s FollowMyHealth portal, you will also be able to view your health information using other applications (apps) compatible with our system.

## 2024-06-17 NOTE — ED ADULT NURSE NOTE - NSFALLRISKINTERV_ED_ALL_ED
Assistance OOB with selected safe patient handling equipment if applicable/Assistance with ambulation/Communicate fall risk and risk factors to all staff, patient, and family/Monitor gait and stability/Monitor for mental status changes and reorient to person, place, and time, as needed/Provide visual cue: yellow wristband, yellow gown, etc/Reinforce activity limits and safety measures with patient and family/Toileting schedule using arm’s reach rule for commode and bathroom/Use of alarms - bed, stretcher, chair and/or video monitoring/Call bell, personal items and telephone in reach/Instruct patient to call for assistance before getting out of bed/chair/stretcher/Non-slip footwear applied when patient is off stretcher/[place] to call system/Physically safe environment - no spills, clutter or unnecessary equipment/Purposeful Proactive Rounding/Room/bathroom lighting operational, light cord in reach

## 2024-06-17 NOTE — ED PROVIDER NOTE - CLINICAL SUMMARY MEDICAL DECISION MAKING FREE TEXT BOX
28-year-old male with alcohol intoxication possible polysubstance use.  Appropriate in the ED.  Will monitor until clinically sober and obtain EKG for tachycardia. SBIRT consult placed.

## 2024-06-17 NOTE — ED PROVIDER NOTE - NSFOLLOWUPINSTRUCTIONS_ED_ALL_ED_FT
Please avoid using drugs and alcohol. Please avoid using drugs and alcohol.    Alcohol Use Disorder    WHAT YOU NEED TO KNOW:    Alcohol use disorder (AUD) is problem drinking. AUD includes alcohol abuse and alcohol dependency.     DISCHARGE INSTRUCTIONS:    Seek care immediately if:     Your heart is beating faster than usual.      You have hallucinations.      You cannot remember what happens while you are drinking.      You have seizures.    Contact your healthcare provider if:     You are anxious and have nausea.      Your hands are shaky and you are sweating heavily.      You have questions or concerns about your condition or care.    Follow up with your healthcare provider as directed: Do not try to stop drinking on your own. Your healthcare provider may need to help you withdraw from alcohol safely. He may need to admit you to the hospital. You may also need any of the following treatments:    Medicines to decrease your craving for alcohol      Support groups such as Alcoholics Anonymous       Therapy from a psychiatrist or psychologist       Admission to an inpatient facility for treatment for severe AUD    Interested in discussing options to reduce your alcohol or drug use?      [facility]: [phone]   [facility] Substance Abuse Services: [phone], option #2   Methadone Maintenance & Ambulatory Opiate Detox: [phone]  [facility]: [phone]  [facility]: [phone]  [facility]: [phone]    [facility]: [phone], option #2   [facility]: [phone]    [facility]: [phone]    [facility] Central Intake: [phone]  [facility] Chemical Dependency/Ancillary Withdrawal: [phone]  [facility] Methadone Maintenance: [phone]    [facility]: [phone]  [facility] Addiction Treatment Services: [phone]    [facility]Line: 2-129-2-[facility] Office of Alcoholism and Substance Abuse Services (OASAS): https://www.oasas.[place].gov/providerdirectory/  [facility] for Addiction Services and Psychotherapy Interventions Research (CASPIR)  www.[facility].org     Interested in discussing options to reduce your tobacco use?    [facility] for Tobacco Control:  [phone]  [facility] QUITLINE: 5-038-RC-QUITS ([phone])    Interested in learning more about substance use?      http://rethinkingdrinking.niaaa.nih.gov   https://www.drugabuse.gov/patients-families     Learn more about opioid overdose prevention programs in [facility]:  http://www.[facility].ny.[facility]/diseases/aids/general/opioid_overdose_prevention/

## 2024-06-17 NOTE — ED PROVIDER NOTE - PROGRESS NOTE DETAILS
received s/o from dr camarillo pending sobriety. pt awake and alert, speaking clearly and ambulating well. plan for dc. MD GABY

## 2024-06-17 NOTE — ED PROVIDER NOTE - OBJECTIVE STATEMENT
28-year-[place]  Patient admits to drinking alcohol today and also smoked an unknown substance that was offered to him by a stranger at the train station.  Patient is alert and cooperative here in the ED.  Denies any chest pain, nausea vomiting or diarrhea.  No shortness of breath.

## 2024-08-11 ENCOUNTER — EMERGENCY (EMERGENCY)
Facility: HOSPITAL | Age: 29
LOS: 1 days | Discharge: HOME | End: 2024-08-11
Attending: STUDENT IN AN ORGANIZED HEALTH CARE EDUCATION/TRAINING PROGRAM
Payer: COMMERCIAL

## 2024-08-11 VITALS
HEART RATE: 110 BPM | HEIGHT: 68 IN | TEMPERATURE: 99 F | SYSTOLIC BLOOD PRESSURE: 128 MMHG | DIASTOLIC BLOOD PRESSURE: 71 MMHG | WEIGHT: 179.9 LBS | OXYGEN SATURATION: 96 % | RESPIRATION RATE: 18 BRPM

## 2024-08-11 VITALS
OXYGEN SATURATION: 96 % | HEART RATE: 99 BPM | RESPIRATION RATE: 18 BRPM | SYSTOLIC BLOOD PRESSURE: 134 MMHG | DIASTOLIC BLOOD PRESSURE: 70 MMHG | TEMPERATURE: 98 F

## 2024-08-11 PROCEDURE — 73590 X-RAY EXAM OF LOWER LEG: CPT

## 2024-08-11 PROCEDURE — 73600 X-RAY EXAM OF ANKLE: CPT | Mod: 26,LT

## 2024-08-11 PROCEDURE — 73590 X-RAY EXAM OF LOWER LEG: CPT | Mod: 26,LT

## 2024-08-11 PROCEDURE — 73560 X-RAY EXAM OF KNEE 1 OR 2: CPT

## 2024-08-11 PROCEDURE — 73620 X-RAY EXAM OF FOOT: CPT

## 2024-08-11 PROCEDURE — 99284 EMERGENCY DEPT VISIT MOD MDM: CPT

## 2024-08-11 PROCEDURE — 73560 X-RAY EXAM OF KNEE 1 OR 2: CPT | Mod: 26,LT

## 2024-08-11 PROCEDURE — 73620 X-RAY EXAM OF FOOT: CPT | Mod: 26,LT

## 2024-08-11 PROCEDURE — 73600 X-RAY EXAM OF ANKLE: CPT

## 2024-08-11 RX ORDER — ACETAMINOPHEN 500 MG/5ML
975 LIQUID (ML) ORAL ONCE
Refills: 0 | Status: COMPLETED | OUTPATIENT
Start: 2024-08-11 | End: 2024-08-11

## 2024-08-11 RX ADMIN — Medication 975 MILLIGRAM(S): at 16:09

## 2024-08-11 RX ADMIN — Medication 975 MILLIGRAM(S): at 09:45

## 2024-09-10 ENCOUNTER — EMERGENCY (EMERGENCY)
Facility: HOSPITAL | Age: 29
LOS: 1 days | Discharge: HOME | End: 2024-09-10
Attending: EMERGENCY MEDICINE
Payer: COMMERCIAL

## 2024-09-10 VITALS
RESPIRATION RATE: 18 BRPM | HEART RATE: 92 BPM | SYSTOLIC BLOOD PRESSURE: 127 MMHG | DIASTOLIC BLOOD PRESSURE: 74 MMHG | TEMPERATURE: 98 F | OXYGEN SATURATION: 95 % | HEIGHT: 68 IN

## 2024-09-10 PROCEDURE — 99283 EMERGENCY DEPT VISIT LOW MDM: CPT | Mod: 25

## 2024-09-10 PROCEDURE — 99282 EMERGENCY DEPT VISIT SF MDM: CPT

## 2024-09-10 NOTE — ED PROVIDER NOTE - CLINICAL SUMMARY MEDICAL DECISION MAKING FREE TEXT BOX
no SI no HI in no acute distress no signs of trauma stable for discharge ambulating no acute distress

## 2024-09-10 NOTE — ED PROVIDER NOTE - PATIENT PORTAL LINK FT
You can access the FollowMyHealth Patient Portal offered by [facility] by registering at the following website: [email or web address]. By joining [employer]’s FollowMyHealth portal, you will also be able to view your health information using other applications (apps) compatible with our system.

## 2024-09-10 NOTE — ED ADULT NURSE NOTE - NSFALLUNIVINTERV_ED_ALL_ED
Bed/Stretcher in lowest position, wheels locked, appropriate side rails in place/Call bell, personal items and telephone in reach/Instruct patient to call for assistance before getting out of bed/chair/stretcher/Non-slip footwear applied when patient is off stretcher/[place] to call system/Physically safe environment - no spills, clutter or unnecessary equipment/Purposeful proactive rounding/Room/bathroom lighting operational, light cord in reach

## 2024-09-10 NOTE — ED PROVIDER NOTE - OBJECTIVE STATEMENT
Patient presents to ED from prior [facility] stating he did cocaineStates to be discharged now no headache no chest pain no shortness of breath no abdominal pain no motor or sensory deficits no SI or HI

## 2024-09-13 DIAGNOSIS — F19.10 OTHER PSYCHOACTIVE SUBSTANCE ABUSE, UNCOMPLICATED: ICD-10-CM

## 2025-05-19 NOTE — ED ADULT NURSE NOTE - DISCHARGE DATE/TIME
Copied from CRM #[id number]. Topic: MW Messaging - MW Patient Request  >> May 19, 2025 10:32 AM Kris FRIAS wrote:  --DO NOT REPLY - Sent from PACT - If sent to wrong pool, reroute to P [facility] Reroute pool --    General Message: Patient refill for Amlodipine 5mg denied. Patient wants to confirm if he needs to be seen or a PA prior to receiving medication    Callback #: [phone]  Can a detailed message be left? Yes - Voicemail   Caller has been advised this message will be addressed within:2-3 business days [routine]         17-Jun-2024 11:40